# Patient Record
Sex: MALE | Race: WHITE | NOT HISPANIC OR LATINO | Employment: OTHER | URBAN - METROPOLITAN AREA
[De-identification: names, ages, dates, MRNs, and addresses within clinical notes are randomized per-mention and may not be internally consistent; named-entity substitution may affect disease eponyms.]

---

## 2017-09-15 ENCOUNTER — OFFICE VISIT (OUTPATIENT)
Dept: ORTHOPEDIC SURGERY | Facility: CLINIC | Age: 72
End: 2017-09-15

## 2017-09-15 VITALS — WEIGHT: 235 LBS | BODY MASS INDEX: 31.83 KG/M2 | TEMPERATURE: 98.6 F | HEIGHT: 72 IN

## 2017-09-15 DIAGNOSIS — M25.511 ACUTE PAIN OF RIGHT SHOULDER: Primary | ICD-10-CM

## 2017-09-15 DIAGNOSIS — M19.011 PRIMARY OSTEOARTHRITIS OF RIGHT SHOULDER: ICD-10-CM

## 2017-09-15 PROCEDURE — 20610 DRAIN/INJ JOINT/BURSA W/O US: CPT | Performed by: ORTHOPAEDIC SURGERY

## 2017-09-15 PROCEDURE — 73030 X-RAY EXAM OF SHOULDER: CPT | Performed by: ORTHOPAEDIC SURGERY

## 2017-09-15 PROCEDURE — 99214 OFFICE O/P EST MOD 30 MIN: CPT | Performed by: ORTHOPAEDIC SURGERY

## 2017-09-15 RX ADMIN — BUPIVACAINE HYDROCHLORIDE 2 ML: 5 INJECTION, SOLUTION PERINEURAL at 21:24

## 2017-09-15 RX ADMIN — LIDOCAINE HYDROCHLORIDE 2 ML: 10 INJECTION, SOLUTION INFILTRATION; PERINEURAL at 21:24

## 2017-09-15 RX ADMIN — METHYLPREDNISOLONE ACETATE 160 MG: 80 INJECTION, SUSPENSION INTRA-ARTICULAR; INTRALESIONAL; INTRAMUSCULAR; SOFT TISSUE at 21:24

## 2017-09-17 RX ORDER — METHYLPREDNISOLONE ACETATE 80 MG/ML
160 INJECTION, SUSPENSION INTRA-ARTICULAR; INTRALESIONAL; INTRAMUSCULAR; SOFT TISSUE
Status: COMPLETED | OUTPATIENT
Start: 2017-09-15 | End: 2017-09-15

## 2017-09-17 RX ORDER — BUPIVACAINE HYDROCHLORIDE 5 MG/ML
2 INJECTION, SOLUTION PERINEURAL
Status: COMPLETED | OUTPATIENT
Start: 2017-09-15 | End: 2017-09-15

## 2017-09-17 RX ORDER — LIDOCAINE HYDROCHLORIDE 10 MG/ML
2 INJECTION, SOLUTION INFILTRATION; PERINEURAL
Status: COMPLETED | OUTPATIENT
Start: 2017-09-15 | End: 2017-09-15

## 2017-09-18 NOTE — PROGRESS NOTES
Patient: Eliecer Smith    YOB: 1945    Medical Record Number: [id number]    Chief Complaints:  Right shoulder pain    History of Present Illness:     72 y.o. male patient who presents with a long history of progressively worsening pain and dysfunction affecting the right shoulder.  He tells me that his symptoms date back several years but they have gotten significantly worse over the past 3 months or so.  He describes his current pain as mild to moderate, constant, and aching.  He localizes the pain primarily to the posterolateral aspect of the shoulder.  He tells me that he feels that he is losing motion in the shoulder as well.  Anti-inflammatories do help somewhat with his pain but have not helped with his motion.  He is right-hand-dominant.    Allergies:   Allergies   Allergen Reactions   • Mobic [Meloxicam] Rash       Home Medications:    Current Outpatient Prescriptions:   •  naproxen sodium (ALEVE) 220 MG tablet, Take 220 mg by mouth 2 (two) times a day as needed for mild pain (1-3)., Disp: , Rfl:   •  Oxybutynin Chloride (DITROPAN XL PO), Take  by mouth., Disp: , Rfl:   •  tamsulosin (FLOMAX) 0.4 MG capsule 24 hr capsule, Take 1 capsule by mouth daily with breakfast., Disp: , Rfl:   •  amoxicillin (AMOXIL) 875 MG tablet, Take 1 tablet by mouth 2 (two) times a day., Disp: 20 tablet, Rfl: 0  •  cephalexin (KEFLEX) 500 MG capsule, Take 1 capsule by mouth 2 (Two) Times a Day., Disp: 20 capsule, Rfl: 0  •  solifenacin (VESICARE) 5 MG tablet, Take 5 mg by mouth every night., Disp: , Rfl:     Past Medical History:   Diagnosis Date   • Bladder disorder    • Enlarged prostate    • Polio        Past Surgical History:   Procedure Laterality Date   • JOINT REPLACEMENT Left        Social History     Occupational History   • Not on file.     Social History Main Topics   • Smoking status: Former Smoker     Quit date: 3/21/1976   • Smokeless tobacco: Not on file   • Alcohol use 3.6 oz/week     3 Glasses of  "wine, 3 Cans of beer per week   • Drug use: Defer   • Sexual activity: Defer      Social History     Social History Narrative       History reviewed. No pertinent family history.    Review of Systems:      Constitutional: Denies fever, shaking or chills   Eyes: Denies change in visual acuity   HEENT: Denies nasal congestion or sore throat   Respiratory: Denies cough or shortness of breath   Cardiovascular: Denies chest pain or edema  Endocrine: Denies tremors, palpitations, intolerance of heat or cold, polyuria, polydipsia.  GI: Denies abdominal pain, nausea, vomiting, bloody stools or diarrhea  : Denies frequency, urgency, incontinence, retention, or nocturia.  Musculoskeletal: Denies numbness tingling or loss of motor function except as above  Integument: Denies rash, lesion or ulceration   Neurologic: Denies headache or focal weakness, deficits  Heme: Denies epistaxis, spontaneous or excessive bleeding, epistaxis, hematuria, melena, fatigue, enlarged or tender lymph nodes.      All other pertinent positives and negatives as noted above in HPI.    Physical Exam: 72 y.o. male  Vitals:    09/15/17 1002   Temp: 98.6 °F (37 °C)   Weight: 235 lb (107 kg)   Height: 72\" (182.9 cm)       General:  Patient is awake and alert.  Appears in no acute distress or discomfort.    Psych:  Affect and demeanor are appropriate.    Eyes:  Conjunctiva and sclera appear grossly normal.  Eyes track well and EOM seem to be intact.    Ears:  No gross abnormalities.  Hearing adequate for the exam.    Cardiovascular:  Regular rate and rhythm.    Lungs:  Good chest expansion.  Breathing unlabored.    Spine:  Neck appears grossly normal.  No palpable masses or adenopathy.  Good motion.  Spurling's maneuver is negative for any shoulder or arm symptoms.    Extremities:  Skin is benign.  No obvious gross abnormalities about right shoulder.  No palpable masses or adenopathy.  Moderate tenderness noted over anterior glenohumeral joint and rotator " interval.  Motion is to 160° of forward elevation, 40° of external rotation, -5° horizontal abduction, T12 internal rotation.  No instability.  Rotator cuff strength seems to be well preserved but the exam is limited due to patient discomfort with resisted active motion.  Good motor and sensory function in lower arm and hand.  Palpable pulses.         Radiology:  AP, scapular Y, and axillary views of the right shoulder are ordered by myself and reviewed to evaluate the patient's complaint.  No comparison films are immediately available.  The x-rays show moderate glenohumeral osteoarthritis with joint space narrowing, osteophyte formation, and subchondral sclerosis.  The acromiohumeral interval measures normal.    Assessment/Plan:  Right shoulder osteoarthritis    We discussed treatment options in detail including the risks, benefits, and alternatives of conservative treatment versus surgical options.  Regarding conservative treatment, we discussed appropriate activity modifications, anti-inflammatories, injections, and physical therapy.  We also discussed the option of an arthroplasty and all that would entail.  I have recommended that we start with a conservative approach and the patient agrees.    The patient has acknowledged understanding of the information and elected for an injection.  The risks, benefits, and alternatives were discussed.  He consented.  Going forward, he will follow-up as needed.    Large Joint Arthrocentesis  Date/Time: 9/15/2017 9:24 PM  Consent given by: patient  Site marked: site marked  Timeout: Immediately prior to procedure a time out was called to verify the correct patient, procedure, equipment, support staff and site/side marked as required   Supporting Documentation  Indications: pain and joint swelling   Procedure Details  Location: shoulder - R glenohumeral  Preparation: Patient was prepped and draped in the usual sterile fashion  Needle size: 25 G  Approach: anterior  Medications  administered: 2 mL lidocaine 1 %; 160 mg methylPREDNISolone acetate 80 MG/ML; 2 mL bupivacaine  Patient tolerance: patient tolerated the procedure well with no immediate complications            Jere Marion MD    09/15/2017    CC to Sergey Lo MD

## 2018-01-15 ENCOUNTER — OFFICE VISIT (OUTPATIENT)
Dept: ORTHOPEDIC SURGERY | Facility: CLINIC | Age: 73
End: 2018-01-15

## 2018-01-15 VITALS — HEIGHT: 72 IN | WEIGHT: 234 LBS | TEMPERATURE: 98.5 F | BODY MASS INDEX: 31.69 KG/M2

## 2018-01-15 DIAGNOSIS — S80.02XA CONTUSION OF LEFT KNEE, INITIAL ENCOUNTER: ICD-10-CM

## 2018-01-15 DIAGNOSIS — Z96.652 HISTORY OF TOTAL KNEE ARTHROPLASTY, LEFT: Primary | ICD-10-CM

## 2018-01-15 PROCEDURE — 73560 X-RAY EXAM OF KNEE 1 OR 2: CPT | Performed by: ORTHOPAEDIC SURGERY

## 2018-01-15 PROCEDURE — 99213 OFFICE O/P EST LOW 20 MIN: CPT | Performed by: ORTHOPAEDIC SURGERY

## 2018-01-15 RX ORDER — GABAPENTIN 300 MG/1
CAPSULE ORAL
Refills: 0 | COMMUNITY
Start: 2017-12-26 | End: 2018-06-29

## 2018-01-15 RX ORDER — OXYBUTYNIN CHLORIDE 10 MG/1
TABLET, EXTENDED RELEASE ORAL
Refills: 0 | COMMUNITY
Start: 2017-11-07

## 2018-01-15 RX ORDER — ASPIRIN 325 MG
325 TABLET ORAL
COMMUNITY
End: 2018-06-29 | Stop reason: ALTCHOICE

## 2018-01-15 NOTE — PROGRESS NOTES
Patient Name: Eliecer Smith   YOB: 1945  Referring Primary Care Physician: Sergey Lo MD      Chief Complaint:    Chief Complaint   Patient presents with   • Left Knee - Establish Care, Pain        Subjective:    HPI:   Eliecer Smith is a pleasant 72 y.o. year old who presents today for evaluation of   Chief Complaint   Patient presents with   • Left Knee - Establish Care, Pain   . Fell; a few weeks ago n replaced left knee.  Had swelling etc but beter.  Wanted to check. Left tka in 2002    This problem is new to this examiner.     Medications:   Home Medications:  Current Outpatient Prescriptions on File Prior to Visit   Medication Sig   • naproxen sodium (ALEVE) 220 MG tablet Take 220 mg by mouth 2 (two) times a day as needed for mild pain (1-3).   • tamsulosin (FLOMAX) 0.4 MG capsule 24 hr capsule Take 1 capsule by mouth daily with breakfast.   • [DISCONTINUED] amoxicillin (AMOXIL) 875 MG tablet Take 1 tablet by mouth 2 (two) times a day.   • [DISCONTINUED] cephalexin (KEFLEX) 500 MG capsule Take 1 capsule by mouth 2 (Two) Times a Day.   • [DISCONTINUED] Oxybutynin Chloride (DITROPAN XL PO) Take  by mouth.   • [DISCONTINUED] solifenacin (VESICARE) 5 MG tablet Take 5 mg by mouth every night.     No current facility-administered medications on file prior to visit.      Current Medications:  Scheduled Meds:  Continuous Infusions:  No current facility-administered medications for this visit.   PRN Meds:.    I have reviewed the patient's medical history in detail and updated the computerized patient record.  Review and summarization of old records includes:    Past Medical History:   Diagnosis Date   • Bladder disorder    • Enlarged prostate    • Polio         Past Surgical History:   Procedure Laterality Date   • JOINT REPLACEMENT Left         Social History     Occupational History   • Not on file.     Social History Main Topics   • Smoking status: Former Smoker     Quit date: 3/21/1976   •  Smokeless tobacco: Not on file   • Alcohol use 3.6 oz/week     3 Glasses of wine, 3 Cans of beer per week   • Drug use: Defer   • Sexual activity: Defer    Social History     Social History Narrative      History reviewed. No pertinent family history.    ROS: 14 point review of systems was performed and all other systems were reviewed and are negative except for documented findings in HPI and today's encounter.     Allergies:   Allergies   Allergen Reactions   • Mobic [Meloxicam] Rash     Constitutional:  Denies fever, shaking or chills   Eyes:  Denies change in visual acuity   HENT:  Denies nasal congestion or sore throat   Respiratory:  Denies cough or shortness of breath   Cardiovascular:  Denies chest pain or severe LE edema   GI:  Denies abdominal pain, nausea, vomiting, bloody stools or diarrhea   Musculoskeletal:  Numbness, tingling, pain, or loss of motor function only as noted above in history of present illness.  : Denies painful urination or hematuria  Integument:  Denies rash, lesion or ulceration   Neurologic:  Denies headache or focal weakness  Endocrine:  Denies lymphadenopathy  Psych:  Denies confusion or change in mental status   Hem:  Denies active bleeding    Objective:    Physical Exam: 72 y.o. male  Body mass index is 31.74 kg/(m^2)., @WT@, @HT@  Vitals:    01/15/18 0855   Temp: 98.5 °F (36.9 °C)     Vital signs reviewed.   General Appearance:    Alert, cooperative, in no acute distress                  Eyes: conjunctiva clear  ENT: external ears and nose atraumatic  CV: no peripheral edema  Resp: normal respiratory effort  Skin: no rashes or wounds; normal turgor  Psych: mood and affect appropriate  Lymph: no nodes appreciated  Neuro: gross sensation intact  Vascular:  Palpable peripheral pulse in noted extremity  Musculoskeletal Extremities: no bruising or swelling.  good rom and stability.  good patellar tracking    Radiology:   Imaging done today and discussed at length with the  patient:    Indication: pain related symptoms and total joint follow up,  Views: 2V AP&LAT left knee(s)   Findings: S/P left  Total Knee Replacement in good position and alignment  Comparison views: viewed last xray done in the office.       Assessment:     ICD-10-CM ICD-9-CM   1. History of total knee arthroplasty, left Z96.652 V43.65   2. Contusion of left knee, initial encounter S80.02XA 924.11        Procedures       Plan: Biomechanics of pertinent body area discussed.  Risks, benefits, alternatives, comparisons, and complications of accepted medicines, injections, recommendations, surgical procedures, and therapies explained and education provided in laymen's terms. The patient was given the opportunity to ask questions and they were answerved to their satisfaction.   Natural history and expected course of this patient's diagnosis discussed along with evaluation of therapies. Questions answered.  Biomechanics and proper use of ambulatory aids:  crutches, walker, cane, &/or trekking poles.      1/15/2018

## 2018-06-29 ENCOUNTER — OFFICE VISIT (OUTPATIENT)
Dept: ORTHOPEDIC SURGERY | Facility: CLINIC | Age: 73
End: 2018-06-29

## 2018-06-29 VITALS — HEIGHT: 72 IN | WEIGHT: 230 LBS | BODY MASS INDEX: 31.15 KG/M2

## 2018-06-29 DIAGNOSIS — M19.019 ARTHRITIS OF SHOULDER: Primary | ICD-10-CM

## 2018-06-29 PROCEDURE — 20610 DRAIN/INJ JOINT/BURSA W/O US: CPT | Performed by: ORTHOPAEDIC SURGERY

## 2018-06-29 RX ORDER — FLECAINIDE ACETATE 100 MG/1
100 TABLET ORAL
COMMUNITY
Start: 2018-05-19

## 2018-06-29 RX ORDER — DILTIAZEM HYDROCHLORIDE 120 MG/1
120 CAPSULE, EXTENDED RELEASE ORAL
COMMUNITY
Start: 2018-05-19

## 2018-06-29 RX ADMIN — METHYLPREDNISOLONE ACETATE 80 MG: 80 INJECTION, SUSPENSION INTRA-ARTICULAR; INTRALESIONAL; INTRAMUSCULAR; SOFT TISSUE at 15:00

## 2018-06-29 RX ADMIN — LIDOCAINE HYDROCHLORIDE 2 ML: 10 INJECTION, SOLUTION EPIDURAL; INFILTRATION; INTRACAUDAL; PERINEURAL at 15:00

## 2018-07-01 RX ORDER — METHYLPREDNISOLONE ACETATE 80 MG/ML
80 INJECTION, SUSPENSION INTRA-ARTICULAR; INTRALESIONAL; INTRAMUSCULAR; SOFT TISSUE
Status: COMPLETED | OUTPATIENT
Start: 2018-06-29 | End: 2018-06-29

## 2018-07-01 RX ORDER — LIDOCAINE HYDROCHLORIDE 10 MG/ML
2 INJECTION, SOLUTION EPIDURAL; INFILTRATION; INTRACAUDAL; PERINEURAL
Status: COMPLETED | OUTPATIENT
Start: 2018-06-29 | End: 2018-06-29

## 2018-08-31 ENCOUNTER — OFFICE VISIT (OUTPATIENT)
Dept: ORTHOPEDIC SURGERY | Facility: CLINIC | Age: 73
End: 2018-08-31

## 2018-08-31 VITALS — WEIGHT: 230 LBS | BODY MASS INDEX: 31.15 KG/M2 | HEIGHT: 72 IN | TEMPERATURE: 98.7 F

## 2018-08-31 DIAGNOSIS — M19.019 ARTHRITIS OF SHOULDER: Primary | ICD-10-CM

## 2018-08-31 PROCEDURE — 20610 DRAIN/INJ JOINT/BURSA W/O US: CPT | Performed by: ORTHOPAEDIC SURGERY

## 2018-08-31 RX ADMIN — METHYLPREDNISOLONE ACETATE 80 MG: 80 INJECTION, SUSPENSION INTRA-ARTICULAR; INTRALESIONAL; INTRAMUSCULAR; SOFT TISSUE at 16:50

## 2018-08-31 RX ADMIN — LIDOCAINE HYDROCHLORIDE 2 ML: 20 INJECTION, SOLUTION EPIDURAL; INFILTRATION; INTRACAUDAL; PERINEURAL at 16:50

## 2018-09-04 RX ORDER — METHYLPREDNISOLONE ACETATE 80 MG/ML
80 INJECTION, SUSPENSION INTRA-ARTICULAR; INTRALESIONAL; INTRAMUSCULAR; SOFT TISSUE
Status: COMPLETED | OUTPATIENT
Start: 2018-08-31 | End: 2018-08-31

## 2018-09-04 RX ORDER — LIDOCAINE HYDROCHLORIDE 20 MG/ML
2 INJECTION, SOLUTION EPIDURAL; INFILTRATION; INTRACAUDAL; PERINEURAL
Status: COMPLETED | OUTPATIENT
Start: 2018-08-31 | End: 2018-08-31

## 2019-04-03 ENCOUNTER — PREP FOR SURGERY (OUTPATIENT)
Dept: OTHER | Facility: HOSPITAL | Age: 74
End: 2019-04-03

## 2019-04-03 DIAGNOSIS — Z86.010 HISTORY OF COLON POLYPS: Primary | ICD-10-CM

## 2019-04-08 PROBLEM — Z86.010 HISTORY OF COLON POLYPS: Status: ACTIVE | Noted: 2019-04-08

## 2019-04-08 PROBLEM — Z86.0100 HISTORY OF COLON POLYPS: Status: ACTIVE | Noted: 2019-04-08

## 2019-06-28 PROBLEM — M54.50 LUMBAR PAIN WITH RADIATION DOWN LEG: Status: ACTIVE | Noted: 2017-10-20

## 2019-06-28 PROBLEM — M79.606 LUMBAR PAIN WITH RADIATION DOWN LEG: Status: ACTIVE | Noted: 2017-10-20

## 2019-06-28 PROBLEM — N40.0 BPH (BENIGN PROSTATIC HYPERPLASIA): Status: ACTIVE | Noted: 2019-06-28

## 2019-06-28 PROBLEM — G89.29 CHRONIC RIGHT SHOULDER PAIN: Status: ACTIVE | Noted: 2018-12-03

## 2019-06-28 PROBLEM — I48.19 PERSISTENT ATRIAL FIBRILLATION: Status: ACTIVE | Noted: 2018-05-16

## 2019-06-28 PROBLEM — M25.511 CHRONIC RIGHT SHOULDER PAIN: Status: ACTIVE | Noted: 2018-12-03

## 2019-07-03 ENCOUNTER — ANESTHESIA (OUTPATIENT)
Dept: GASTROENTEROLOGY | Facility: HOSPITAL | Age: 74
End: 2019-07-03

## 2019-07-03 ENCOUNTER — ANESTHESIA EVENT (OUTPATIENT)
Dept: GASTROENTEROLOGY | Facility: HOSPITAL | Age: 74
End: 2019-07-03

## 2019-07-03 ENCOUNTER — HOSPITAL ENCOUNTER (OUTPATIENT)
Facility: HOSPITAL | Age: 74
Setting detail: HOSPITAL OUTPATIENT SURGERY
Discharge: HOME | End: 2019-07-03
Attending: COLON & RECTAL SURGERY | Admitting: COLON & RECTAL SURGERY

## 2019-07-03 VITALS
OXYGEN SATURATION: 95 % | BODY MASS INDEX: 29.8 KG/M2 | HEART RATE: 66 BPM | HEIGHT: 72 IN | DIASTOLIC BLOOD PRESSURE: 80 MMHG | RESPIRATION RATE: 16 BRPM | TEMPERATURE: 97.7 F | WEIGHT: 220 LBS | SYSTOLIC BLOOD PRESSURE: 113 MMHG

## 2019-07-03 DIAGNOSIS — Z86.010 HISTORY OF COLON POLYPS: ICD-10-CM

## 2019-07-03 PROCEDURE — 45380 COLONOSCOPY AND BIOPSY: CPT | Performed by: COLON & RECTAL SURGERY

## 2019-07-03 PROCEDURE — 88305 TISSUE EXAM BY PATHOLOGIST: CPT | Performed by: COLON & RECTAL SURGERY

## 2019-07-03 PROCEDURE — 25010000002 PROPOFOL 10 MG/ML EMULSION: Performed by: ANESTHESIOLOGY

## 2019-07-03 RX ORDER — SODIUM CHLORIDE, SODIUM LACTATE, POTASSIUM CHLORIDE, CALCIUM CHLORIDE 600; 310; 30; 20 MG/100ML; MG/100ML; MG/100ML; MG/100ML
30 INJECTION, SOLUTION INTRAVENOUS CONTINUOUS PRN
Status: DISCONTINUED | OUTPATIENT
Start: 2019-07-03 | End: 2019-07-03 | Stop reason: HOSPADM

## 2019-07-03 RX ORDER — PROPOFOL 10 MG/ML
VIAL (ML) INTRAVENOUS CONTINUOUS PRN
Status: DISCONTINUED | OUTPATIENT
Start: 2019-07-03 | End: 2019-07-03 | Stop reason: SURG

## 2019-07-03 RX ORDER — PROPOFOL 10 MG/ML
VIAL (ML) INTRAVENOUS AS NEEDED
Status: DISCONTINUED | OUTPATIENT
Start: 2019-07-03 | End: 2019-07-03 | Stop reason: SURG

## 2019-07-03 RX ORDER — LIDOCAINE HYDROCHLORIDE 20 MG/ML
INJECTION, SOLUTION INFILTRATION; PERINEURAL AS NEEDED
Status: DISCONTINUED | OUTPATIENT
Start: 2019-07-03 | End: 2019-07-03 | Stop reason: SURG

## 2019-07-03 RX ORDER — SODIUM CHLORIDE 0.9 % (FLUSH) 0.9 %
3 SYRINGE (ML) INJECTION EVERY 12 HOURS SCHEDULED
Status: DISCONTINUED | OUTPATIENT
Start: 2019-07-03 | End: 2019-07-03 | Stop reason: HOSPADM

## 2019-07-03 RX ORDER — SODIUM CHLORIDE 0.9 % (FLUSH) 0.9 %
3-10 SYRINGE (ML) INJECTION AS NEEDED
Status: DISCONTINUED | OUTPATIENT
Start: 2019-07-03 | End: 2019-07-03 | Stop reason: HOSPADM

## 2019-07-03 RX ADMIN — LIDOCAINE HYDROCHLORIDE 50 MG: 20 INJECTION, SOLUTION INFILTRATION; PERINEURAL at 07:35

## 2019-07-03 RX ADMIN — PROPOFOL 125 MG: 10 INJECTION, EMULSION INTRAVENOUS at 07:35

## 2019-07-03 RX ADMIN — PROPOFOL 140 MCG/KG/MIN: 10 INJECTION, EMULSION INTRAVENOUS at 07:35

## 2019-07-03 RX ADMIN — SODIUM CHLORIDE, POTASSIUM CHLORIDE, SODIUM LACTATE AND CALCIUM CHLORIDE 30 ML/HR: 600; 310; 30; 20 INJECTION, SOLUTION INTRAVENOUS at 07:09

## 2019-07-03 NOTE — DISCHARGE INSTRUCTIONS
For the next 24 hours patient needs to be with a responsible adult.    For 24 hours DO NOT drive, operate machinery, appliances, drink alcohol, make important decisions or sign legal documents.    Start with a light or bland diet if you are feeling sick to your stomach otherwise advance to regular diet as tolerated.    Follow recommendations on procedure report if provided by your doctor.    Call Dr LAZO for problems [phone]    Problems may include but not limited to: large amounts of bleeding, trouble breathing, repeated vomiting, severe unrelieved pain, fever or chills.

## 2019-07-03 NOTE — H&P
Eliecer Smith is a 74 y.o. male  who is referred by Citlalli Gonzalez MD for a colonoscopy. He is an  has a history of previous adenomatous polyp.     He denies any change in bowel function, melena, or hematochezia.    Past Medical History:   Diagnosis Date   • Arthritis    • Bladder disorder    • BPH (benign prostatic hyperplasia)    • Cardiomegaly 08/2016   • Chronic right-sided low back pain without sciatica    • Colon polyps     FOLLOWED BY DR. CITLALLI GONZALEZ   • DDD (degenerative disc disease), lumbar    • ED (erectile dysfunction)    • Hemoptysis 10/2013   • Hemorrhagic otitis externa of right ear 10/06/2016   • Hyperlipidemia    • IFG (impaired fasting glucose)    • Irregular heart beat    • Laceration of finger 06/04/2018    LEFT MIDDLE FINGER   • Leg edema, left 07/31/2016    WITH CONTUSION, SEEN AT [facility] ER   • OA (osteoarthritis)    • PAF (paroxysmal atrial fibrillation) (CMS/HCC) 05/15/2018    ADMITTED TO [facility]   • Pharyngitis 05/02/2016    SEEN AT [facility]   • Pharyngitis 03/21/2016    SEEN AT [facility]   • Pneumonia 10/2010   • Polio 1946   • Rectal bleeding    • Skin cancer     SQUAMOUS CELL CARCINOMA OF SCALP   • Swimmer's ear of left side 10/04/2016    SEEN AT [facility]   • Thrombocytopenia (CMS/HCC)        Past Surgical History:   Procedure Laterality Date   • CARDIOVERSION N/A 05/17/2018    DR. LUCA AGUIRRE AT [facility]   • COLONOSCOPY N/A 01/20/2009    ENTIRE COLON WNL, RESCOPE IN 5-10 YRS, DR. EZEKIEL OLIVEIRA AT [facility]   • COLONOSCOPY N/A 02/03/2004    ENTIRE COLON WNL, DR. EZEKIEL OLIVEIRA AT [facility]   • COLONOSCOPY W/ POLYPECTOMY N/A 02/12/2014    7 MM ADENOMATOUS POLYP IN TRANSVERSE, 5 MM ADENOMATOUS POLYP IN PROXIMAL TRANSVERSE, RESCOPE IN 5 YRS, DR. CITLALLI GONZALEZ AT [facility]   • KNEE ARTHROSCOPY  1993   • KNEE ARTHROSCOPY Left 08/14/2001    DR. VANESSA FRITZ AT [facility]   • TOTAL KNEE ARTHROPLASTY Left 06/11/2002    DR. VANESSA FRITZ AT [facility]   • TOTAL SHOULDER REVERSE ARTHROPLASTY Right 12/13/2018    DR. PATRICIA PENA AT  [facility]       Medications Prior to Admission   Medication Sig Dispense Refill Last Dose   • diltiaZEM (TIAZAC) 120 MG 24 hr capsule Take 120 mg by mouth.   7/3/2019 at Unknown time   • flecainide (TAMBOCOR) 100 MG tablet Take 100 mg by mouth.   7/3/2019 at Unknown time   • oxybutynin XL (DITROPAN-XL) 10 MG 24 hr tablet take 1 tablet by mouth once daily  0 7/3/2019 at Unknown time   • tamsulosin (FLOMAX) 0.4 MG capsule 24 hr capsule Take 1 capsule by mouth daily with breakfast.   7/3/2019 at Unknown time   • rivaroxaban (XARELTO) 20 MG tablet Take 20 mg by mouth.   2019       Allergies   Allergen Reactions   • Mobic [Meloxicam] Rash       Family History   Problem Relation Age of Onset   • Heart attack Brother    • Heart disease Brother        Social History     Socioeconomic History   • Marital status:      Spouse name: Not on file   • Number of children: Not on file   • Years of education: Not on file   • Highest education level: Not on file   Tobacco Use   • Smoking status: Former Smoker     Last attempt to quit: 3/21/1976     Years since quittin.3   • Smokeless tobacco: Never Used   Substance and Sexual Activity   • Alcohol use: Yes     Alcohol/week: 3.6 oz     Types: 3 Glasses of wine, 3 Cans of beer per week     Comment: MODERATE AMT   • Drug use: No   • Sexual activity: Defer       Review of Systems   Gastrointestinal: Negative for abdominal pain, nausea and vomiting.   All other systems reviewed and are negative.      Vitals:    19 0657   BP: 119/76   Pulse: 66   Resp: 16   Temp: 97.7 °F (36.5 °C)   SpO2: 96%         Physical Exam   Constitutional: He is oriented to person, place, and time. He appears well-developed and well-nourished.   HENT:   Head: Normocephalic and atraumatic.   Eyes: EOM are normal. Pupils are equal, round, and reactive to light.   Cardiovascular: Regular rhythm.   Pulmonary/Chest: Effort normal.   Abdominal: Soft. He exhibits no distension.   Musculoskeletal: Normal  range of motion.   Neurological: He is alert and oriented to person, place, and time.   Skin: Skin is warm and dry.   Psychiatric: Judgment and thought content normal.         Assessment/Plan      previous adenomatous polyp.         I recommend colonoscopy.  I described risks, benefits of the procedure with the patient including but not limited to bleeding, infection, possibility of perforation and possible polypectomy. All of the patient's questions were answered and they would like to proceed with the above recommendations.

## 2019-07-03 NOTE — ANESTHESIA POSTPROCEDURE EVALUATION
Patient: Eliecer Smith    Procedure Summary     Date:  07/03/19 Room / Location:  [facility] ENDOSCOPY 9 /  [facility]    Anesthesia Start:  0733 Anesthesia Stop:  0755    Procedure:  COLONOSCOPY to cecum with biopsy / polypectomies (N/A ) Diagnosis:       History of colon polyps      (History of colon polyps [Z86.010])    Surgeon:  Tone Gonzalez MD Provider:  Gustabo Whitehead MD    Anesthesia Type:  MAC ASA Status:  3          Anesthesia Type: MAC  Last vitals  BP   105/56 (07/03/19 0807)   Temp   36.5 °C (97.7 °F) (07/03/19 0657)   Pulse   74 (07/03/19 0807)   Resp   16 (07/03/19 0807)     SpO2   97 % (07/03/19 0807)     Post Anesthesia Care and Evaluation    Patient location during evaluation: bedside  Patient participation: complete - patient participated  Level of consciousness: awake and alert  Pain score: 0  Pain management: adequate  Airway patency: patent  Anesthetic complications: No anesthetic complications  PONV Status: none  Cardiovascular status: acceptable  Respiratory status: acceptable  Hydration status: acceptable  Post Neuraxial Block status: Motor and sensory function returned to baseline

## 2019-07-05 LAB
CYTO UR: NORMAL
LAB AP CASE REPORT: NORMAL
LAB AP CLINICAL INFORMATION: NORMAL
PATH REPORT.FINAL DX SPEC: NORMAL
PATH REPORT.GROSS SPEC: NORMAL

## 2021-02-11 ENCOUNTER — OFFICE VISIT (OUTPATIENT)
Dept: ORTHOPEDIC SURGERY | Facility: CLINIC | Age: 76
End: 2021-02-11

## 2021-02-11 VITALS — BODY MASS INDEX: 31.05 KG/M2 | WEIGHT: 229.28 LBS | HEIGHT: 72 IN | TEMPERATURE: 98 F

## 2021-02-11 DIAGNOSIS — Z96.652 STATUS POST TOTAL LEFT KNEE REPLACEMENT: ICD-10-CM

## 2021-02-11 DIAGNOSIS — M71.50 TRAUMATIC BURSITIS: ICD-10-CM

## 2021-02-11 DIAGNOSIS — R52 PAIN: Primary | ICD-10-CM

## 2021-02-11 PROCEDURE — 99213 OFFICE O/P EST LOW 20 MIN: CPT | Performed by: ORTHOPAEDIC SURGERY

## 2021-02-11 PROCEDURE — 73562 X-RAY EXAM OF KNEE 3: CPT | Performed by: ORTHOPAEDIC SURGERY

## 2021-02-11 RX ORDER — AMOXICILLIN 500 MG/1
2000 CAPSULE ORAL
COMMUNITY
Start: 2020-11-02

## 2021-02-11 RX ORDER — CETIRIZINE HYDROCHLORIDE 10 MG/1
10 TABLET ORAL DAILY
COMMUNITY
Start: 2020-04-16 | End: 2021-04-16

## 2021-02-11 NOTE — PROGRESS NOTES
Patient Name: Eliecer Smith   YOB: 1945  Referring Primary Care Physician: Sergey Lo MD  BMI: Body mass index is 31.09 kg/m².    Chief Complaint:    Chief Complaint   Patient presents with   • Left Knee - Follow-up, Pain        HPI:     Eliecer Smith is a 75 y.o. male who presents today for evaluation of   Chief Complaint   Patient presents with   • Left Knee - Follow-up, Pain     The patient reports he slipped and fell on a wet bathroom floor at 5:30 this morning. He confirms he landed on his left knee and noted immediate swelling to the knee. He states he was able to ambulate on the knee, but it is still stiff. The patient reports the left knee has become stiffer over time but denies increased swelling. He confirms taking a blood thinner.    Subjective   Medications:   Home Medications:  Current Outpatient Medications on File Prior to Visit   Medication Sig   • cephalexin (KEFLEX) 500 MG capsule Take 1 capsule by mouth 2 (Two) Times a Day.   • cetirizine (zyrTEC) 10 MG tablet Take 10 mg by mouth Daily.   • diltiaZEM (TIAZAC) 120 MG 24 hr capsule Take 120 mg by mouth.   • flecainide (TAMBOCOR) 100 MG tablet Take 100 mg by mouth.   • mupirocin (BACTROBAN) 2 % ointment Apply  topically to the appropriate area as directed 3 (Three) Times a Day.   • oxybutynin XL (DITROPAN-XL) 10 MG 24 hr tablet take 1 tablet by mouth once daily   • rivaroxaban (XARELTO) 20 MG tablet Take 20 mg by mouth.   • tamsulosin (FLOMAX) 0.4 MG capsule 24 hr capsule Take 1 capsule by mouth daily with breakfast.   • amoxicillin (AMOXIL) 500 MG capsule Take 2,000 mg by mouth.     No current facility-administered medications on file prior to visit.      Current Medications:  Scheduled Meds:  Continuous Infusions:No current facility-administered medications for this visit.     PRN Meds:.    I have reviewed the patient's medical history in detail and updated the computerized patient record.  Review and summarization of old  records includes:    Past Medical History:   Diagnosis Date   • Arthritis    • Bladder disorder    • BPH (benign prostatic hyperplasia)    • Cardiomegaly 08/2016   • Chronic right-sided low back pain without sciatica    • Colon polyps     FOLLOWED BY DR. CITLALLI LAZO   • DDD (degenerative disc disease), lumbar    • ED (erectile dysfunction)    • Hemoptysis 10/2013   • Hemorrhagic otitis externa of right ear 10/06/2016   • Hyperlipidemia    • IFG (impaired fasting glucose)    • Irregular heart beat    • Laceration of finger 06/04/2018    LEFT MIDDLE FINGER   • Leg edema, left 07/31/2016    WITH CONTUSION, SEEN AT [facility] ER   • OA (osteoarthritis)    • PAF (paroxysmal atrial fibrillation) (CMS/HCC) 05/15/2018    ADMITTED TO [facility]   • Pharyngitis 05/02/2016    SEEN AT [facility]   • Pharyngitis 03/21/2016    SEEN AT [facility]   • Pneumonia 10/2010   • Polio 1945   • Rectal bleeding    • Skin cancer     SQUAMOUS CELL CARCINOMA OF SCALP   • Swimmer's ear of left side 10/04/2016    SEEN AT [facility]   • Thrombocytopenia (CMS/HCC)         Past Surgical History:   Procedure Laterality Date   • CARDIOVERSION N/A 05/17/2018    DR. LUCA AGUIRRE AT [facility]   • COLONOSCOPY N/A 01/20/2009    ENTIRE COLON WNL, RESCOPE IN 5-10 YRS, DR. EZEKIEL OLIVEIRA AT [facility]   • COLONOSCOPY N/A 02/03/2004    ENTIRE COLON WNL, DR. EZEKIEL OLIVEIRA AT [facility]   • COLONOSCOPY N/A 7/3/2019    5 MM TUBULAR ADENOMA POLYP IN ILEOCECAL VALVE, 4 MM HYPERPLASTIC POLYP IN SIGMOID, RESCOPE IN 5 YRS, DR. CITLALLI LAZO AT [facility]   • COLONOSCOPY W/ POLYPECTOMY N/A 02/12/2014    7 MM ADENOMATOUS POLYP IN TRANSVERSE, 5 MM ADENOMATOUS POLYP IN PROXIMAL TRANSVERSE, RESCOPE IN 5 YRS, DR. CITLALLI LAZO AT [facility]   • EYE SURGERY     • KNEE ARTHROSCOPY  1993   • KNEE ARTHROSCOPY Left 08/14/2001    DR. VANESSA FRITZ AT [facility]   • TOTAL KNEE ARTHROPLASTY Left 06/11/2002    DR. VANESSA FRITZ AT [facility]   • TOTAL SHOULDER REVERSE ARTHROPLASTY Right 12/13/2018    DR. PATRICIA PENA AT [facility]        Social  "History     Occupational History   • Not on file   Tobacco Use   • Smoking status: Former Smoker     Quit date: 3/21/1976     Years since quittin.9   • Smokeless tobacco: Never Used   Substance and Sexual Activity   • Alcohol use: Yes     Alcohol/week: 6.0 standard drinks     Types: 3 Glasses of wine, 3 Cans of beer per week     Comment: MODERATE AMT   • Drug use: No   • Sexual activity: Defer      Social History     Social History Narrative   • Not on file        Family History   Problem Relation Age of Onset   • Heart attack Brother    • Heart disease Brother        ROS: 14 point review of systems was performed and all other systems were reviewed and are negative except for documented findings in HPI and today's encounter.     Allergies:   Allergies   Allergen Reactions   • Mobic [Meloxicam] Rash     Constitutional:  Denies fever, shaking or chills   Eyes:  Denies change in visual acuity   HENT:  Denies nasal congestion or sore throat   Respiratory:  Denies cough or shortness of breath   Cardiovascular:  Denies chest pain or severe LE edema   GI:  Denies abdominal pain, nausea, vomiting, bloody stools or diarrhea   Musculoskeletal:  Numbness, tingling, pain, or loss of motor function only as noted above in history of present illness.  : Denies painful urination or hematuria  Integument:  Denies rash, lesion or ulceration   Neurologic:  Denies headache or focal weakness  Endocrine:  Denies lymphadenopathy  Psych:  Denies confusion or change in mental status   Hem:  Denies active bleeding    OBJECTIVE:  Physical Exam: 75 y.o. male  Wt Readings from Last 3 Encounters:   21 104 kg (229 lb 4.5 oz)   20 104 kg (230 lb)   20 104 kg (230 lb)     Ht Readings from Last 1 Encounters:   21 182.9 cm (72.01\")     Body mass index is 31.09 kg/m².  Vitals:    21 1037   Temp: 98 °F (36.7 °C)     Vital signs reviewed.     General Appearance:    Alert, cooperative, in no acute distress             "      Eyes: conjunctiva clear  ENT: external ears and nose atraumatic  CV: no peripheral edema  Resp: normal respiratory effort  Skin: no rashes or wounds; normal turgor. Old, midline scar on the left knee from previous surgery. Erythema over the anterior region of the left knee over the bursa and the patella.   Psych: mood and affect appropriate  Lymph: no nodes appreciated  Neuro: gross sensation intact  Vascular:  Palpable peripheral pulse in noted extremity  Musculoskeletal Extremities: Left knee has full range of motion. Varus and valgus stress cause slight laxity with varus stress, but no instability consistent with polyethylene wear. Comparing the let knee to the right knee, the left knee has a small ballotable effusion in the prepatellar bursa consistent with a traumatic bursitis. Anterior and posterior stability is good. Hips are noncontributory. Ambulates with a mildly stiff-legged gait at first but overall ambulates well.    Radiology:   Two views of the left knee, including AP and lateral, were obtained today for history of pain after a fall. Compared to previous x-ray, this shows a well-placed, well-aligned, well-fixed total knee arthroplasty on the left. There is a small, rounded osteophyte superior to it, but that is evident on previous x-rays.    Assessment:     ICD-10-CM ICD-9-CM   1. Pain  R52 780.96   2. Status post total left knee replacement  Z96.652 V43.65   3. Traumatic bursitis  M71.50 727.3        Procedures       Plan:   The diagnosis(es), natural history, pathophysiology and treatment for diagnosis(es) were discussed. Opportunity given and questions answered.  Biomechanics of pertinent body areas discussed.  When appropriate, the use of ambulatory aids discussed.    The diagnosis(es), natural history, pathophysiology and treatment for diagnosis(es) were discussed. Opportunity given and questions answered.  Biomechanics of pertinent body areas discussed.  When appropriate, the use of  ambulatory aids discussed.  Inflammation/pain control; with cold, heat, elevation and/or liniments discussed as appropriate    We discussed treatment options for the left knee. I recommend ice application, compression with an ACE bandage, and Tylenol. He cannot take anti-inflammatory medication. I recommend that he continue taking the blood thinner. If the left knee swelling increases, or his pain worsens, we can consider aspirating the bursa. I discussed with the patient the anatomy of the bursa and how swelling can affect it. I counseled him regarding the risks of recurrence given that he is on a blood thinner, as well as the risk of infection. He was advised that aspiration may be indicated if he experiences fever, chills, or increased erythema, swelling, or warmth to the knee; however, an injury of this nature usually resolves with compression and ice.      Scribed for Yosef Joseph MD by JENNIFER MURRAY.  2/11/2021  11:43 EST    I Yosef Joseph MD have personally performed the services described in this document as scribed by the above individual, and it is both accurate and complete.     Yosef Joseph MD  2/11/2021  12:08 EST      2/11/2021

## 2021-06-10 ENCOUNTER — TELEPHONE (OUTPATIENT)
Dept: ORTHOPEDIC SURGERY | Facility: CLINIC | Age: 76
End: 2021-06-10

## 2021-06-10 ENCOUNTER — OFFICE VISIT (OUTPATIENT)
Dept: ORTHOPEDIC SURGERY | Facility: CLINIC | Age: 76
End: 2021-06-10

## 2021-06-10 VITALS — HEIGHT: 72 IN | TEMPERATURE: 97.3 F | BODY MASS INDEX: 31.15 KG/M2 | WEIGHT: 230 LBS

## 2021-06-10 DIAGNOSIS — Z86.12 HISTORY OF POLIOMYELITIS: ICD-10-CM
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
## 2021-06-10 DIAGNOSIS — R52 PAIN: Primary | ICD-10-CM

## 2021-06-10 DIAGNOSIS — M25.571 PAIN AND SWELLING OF RIGHT ANKLE: ICD-10-CM

## 2021-06-10 DIAGNOSIS — M25.471 PAIN AND SWELLING OF RIGHT ANKLE: ICD-10-CM

## 2021-06-10 DIAGNOSIS — Z96.652 HISTORY OF TOTAL KNEE ARTHROPLASTY, LEFT: ICD-10-CM

## 2021-06-10 PROCEDURE — 73610 X-RAY EXAM OF ANKLE: CPT | Performed by: NURSE PRACTITIONER

## 2021-06-10 PROCEDURE — 99213 OFFICE O/P EST LOW 20 MIN: CPT | Performed by: NURSE PRACTITIONER

## 2021-06-10 RX ORDER — FINASTERIDE 5 MG/1
5 TABLET, FILM COATED ORAL DAILY
COMMUNITY
Start: 2021-06-03

## 2021-06-10 RX ORDER — FLUTICASONE PROPIONATE 50 MCG
1 SPRAY, SUSPENSION (ML) NASAL AS NEEDED
COMMUNITY
Start: 2021-05-24

## 2021-06-10 NOTE — TELEPHONE ENCOUNTER
Caller: MALINDA SHARP    Relationship to patient: SELF    Best call back number: [phone]    Chief complaint:  SPM PATIENT. PATIENT FELL ON 5/24/21 AND HAS PAIN IN HIS RIGHT LEG FROM BELOW THE KNEE TO THE ANKLE AND HAS SWELLING IN THE RIGHT ANKLE. DOES [facility] WANT TO SEE PATIENT FOR THIS OR REFER?    Type of visit: NEW PROBLEM    Requested date: ASAP

## 2021-06-10 NOTE — TELEPHONE ENCOUNTER
Caller: MALINDA SHARP     Relationship: SELF     What was the call regarding: PATIENT RETURNED PHONE CALL- CONFIRMED APPT IS FINE FOR THIS AFTERNOON W/ XI FRITZ - UPDATED APPOINTMENT  NOTE  AS WELL

## 2021-06-10 NOTE — PROGRESS NOTES
Patient Name: Eliecer Smith   YOB: 1945  Referring Primary Care Physician: Sergey Lo MD  BMI: Body mass index is 31.19 kg/m².    Chief Complaint:    Chief Complaint   Patient presents with   • Right Ankle - Pain        HPI: New patient to me patient of Dr. Soliz has had a total knee arthroplasty has a history of poliomyelitis to the right lower extremity fell playing golf on May 24 has been limping around and decided to be seen today because of swelling and pain in his ankle. Pt has been having pain and swelling and noted some redness to ankle this am. He wears an elevated build up on his shoe. He is on xarelto for a fib    Eliecer Smith is a 76 y.o. male who presents today for evaluation of   Chief Complaint   Patient presents with   • Right Ankle - Pain         Subjective   Medications:   Home Medications:  Current Outpatient Medications on File Prior to Visit   Medication Sig   • diltiaZEM (TIAZAC) 120 MG 24 hr capsule Take 120 mg by mouth.   • finasteride (PROSCAR) 5 MG tablet Take 5 mg by mouth Daily.   • flecainide (TAMBOCOR) 100 MG tablet Take 100 mg by mouth.   • oxybutynin XL (DITROPAN-XL) 10 MG 24 hr tablet take 1 tablet by mouth once daily   • rivaroxaban (XARELTO) 20 MG tablet Take 20 mg by mouth.   • tamsulosin (FLOMAX) 0.4 MG capsule 24 hr capsule Take 1 capsule by mouth daily with breakfast.   • amoxicillin (AMOXIL) 500 MG capsule Take 2,000 mg by mouth.   • cephalexin (KEFLEX) 500 MG capsule Take 1 capsule by mouth 2 (Two) Times a Day.   • cetirizine (zyrTEC) 10 MG tablet Take 10 mg by mouth Daily.   • fluticasone (FLONASE) 50 MCG/ACT nasal spray 1 spray by Each Nare route As Needed.   • mupirocin (BACTROBAN) 2 % ointment Apply  topically to the appropriate area as directed 3 (Three) Times a Day.     No current facility-administered medications on file prior to visit.     Current Medications:  Scheduled Meds:  Continuous Infusions:No current facility-administered  medications for this visit.    PRN Meds:.    I have reviewed the patient's medical history in detail and updated the computerized patient record.  Review and summarization of old records includes:    Past Medical History:   Diagnosis Date   • Arthritis    • Bladder disorder    • BPH (benign prostatic hyperplasia)    • Cardiomegaly 08/2016   • Chronic right-sided low back pain without sciatica    • Colon polyps     FOLLOWED BY DR. CITLALLI LAZO   • DDD (degenerative disc disease), lumbar    • ED (erectile dysfunction)    • Hemoptysis 10/2013   • Hemorrhagic otitis externa of right ear 10/06/2016   • Hyperlipidemia    • IFG (impaired fasting glucose)    • Irregular heart beat    • Laceration of finger 06/04/2018    LEFT MIDDLE FINGER   • Leg edema, left 07/31/2016    WITH CONTUSION, SEEN AT [facility] ER   • OA (osteoarthritis)    • PAF (paroxysmal atrial fibrillation) (CMS/HCC) 05/15/2018    ADMITTED TO [place]   • Pharyngitis 05/02/2016    SEEN AT [facility]   • Pharyngitis 03/21/2016    SEEN AT [facility]   • Pneumonia 10/2010   • Polio 1946   • Rectal bleeding    • Skin cancer     SQUAMOUS CELL CARCINOMA OF SCALP   • Swimmer's ear of left side 10/04/2016    SEEN AT [facility]   • Thrombocytopenia (CMS/HCC)         Past Surgical History:   Procedure Laterality Date   • CARDIOVERSION N/A 05/17/2018    DR. LUCA AGUIRRE AT [place]   • COLONOSCOPY N/A 01/20/2009    ENTIRE COLON WNL, RESCOPE IN 5-10 YRS, DR. EZEKIEL OLIVEIRA AT [facility]   • COLONOSCOPY N/A 02/03/2004    ENTIRE COLON WNL, DR. EZEKIEL OLIVEIRA AT [facility]   • COLONOSCOPY N/A 7/3/2019    5 MM TUBULAR ADENOMA POLYP IN ILEOCECAL VALVE, 4 MM HYPERPLASTIC POLYP IN SIGMOID, RESCOPE IN 5 YRS, DR. CITLALLI LAZO AT [facility]   • COLONOSCOPY W/ POLYPECTOMY N/A 02/12/2014    7 MM ADENOMATOUS POLYP IN TRANSVERSE, 5 MM ADENOMATOUS POLYP IN PROXIMAL TRANSVERSE, RESCOPE IN 5 YRS, DR. CITLALLI LAZO AT [facility]   • EYE SURGERY     • KNEE ARTHROSCOPY  1993   • KNEE ARTHROSCOPY Left 08/14/2001    DR. VANESSA FRITZ AT [place]  "  • TOTAL KNEE ARTHROPLASTY Left 2002    DR. VANESSA FRITZ AT [place]   • TOTAL SHOULDER REVERSE ARTHROPLASTY Right 2018    DR. PATRICIA PENA AT [facility]        Social History     Occupational History   • Not on file   Tobacco Use   • Smoking status: Former Smoker     Quit date: 3/21/1976     Years since quittin.2   • Smokeless tobacco: Never Used   Substance and Sexual Activity   • Alcohol use: Yes     Alcohol/week: 6.0 standard drinks     Types: 3 Glasses of wine, 3 Cans of beer per week     Comment: MODERATE AMT   • Drug use: No   • Sexual activity: Defer      Social History     Social History Narrative   • Not on file        Family History   Problem Relation Age of Onset   • Heart attack Brother    • Heart disease Brother        ROS: 14 point review of systems was performed and all other systems were reviewed and are negative except for documented findings in HPI and today's encounter.     Allergies:   Allergies   Allergen Reactions   • Mobic [Meloxicam] Rash     Constitutional:  Denies fever, shaking or chills   Eyes:  Denies change in visual acuity   HENT:  Denies nasal congestion or sore throat   Respiratory:  Denies cough or shortness of breath   Cardiovascular:  Denies chest pain or severe LE edema   GI:  Denies abdominal pain, nausea, vomiting, bloody stools or diarrhea   Musculoskeletal:  Numbness, tingling, pain, or loss of motor function only as noted above in history of present illness.  : Denies painful urination or hematuria  Integument:  Denies rash, lesion or ulceration   Neurologic:  Denies headache or focal weakness  Endocrine:  Denies lymphadenopathy  Psych:  Denies confusion or change in mental status   Hem:  Denies active bleeding    OBJECTIVE:  Physical Exam: 76 y.o. male  Wt Readings from Last 3 Encounters:   06/10/21 104 kg (230 lb)   21 104 kg (229 lb 4.5 oz)   20 104 kg (230 lb)     Ht Readings from Last 1 Encounters:   06/10/21 182.9 cm (72\")     Body mass index " is 31.19 kg/m².  Vitals:    06/10/21 1448   Temp: 97.3 °F (36.3 °C)     Vital signs reviewed.     General Appearance:    Alert, cooperative, in no acute distress                  Eyes: conjunctiva clear  ENT: external ears and nose atraumatic  CV: no peripheral edema  Resp: normal respiratory effort  Skin: no rashes or wounds; normal turgor  Psych: mood and affect appropriate  Lymph: no nodes appreciated  Neuro: gross sensation intact  Vascular:  Palpable peripheral pulse in noted extremity  Musculoskeletal Extremities: Skin is warm dry and intact with good pulses movement and sensation he is quite a bit of swelling medially into the lateral malleolus base of fifth metatarsal is nontender he has a bit of a slope deformity due to polio difficult to ascertain normal structure there appears to be no views deformity compartments are soft there is some redness to the lateral malleolus is not a fracture of the wrist cervix could be an early area of prominent swelling    Radiology: Right ankle 3 views done for pain no comparison views readily available no acute fracture arthritic changes noted        Assessment:     ICD-10-CM ICD-9-CM   1. Pain  R52 780.96   2. History of total knee arthroplasty, left  Z96.652 V43.65   3. Accidental fall, initial encounter  W19.XXXA E888.9   4. History of poliomyelitis  Z86.12 V12.02   5. Pain and swelling of right ankle  M25.571 719.47    M25.471 719.07        MDM/Plan:   The diagnosis(es), natural history, pathophysiology and treatment for diagnosis(es) were discussed. Opportunity given and questions answered.  Biomechanics of pertinent body areas discussed.  When appropriate, the use of ambulatory aids discussed.    The diagnosis(es), natural history, pathophysiology and treatment for diagnosis(es) were discussed. Opportunity given and questions answered.  Biomechanics of pertinent body areas discussed.  When appropriate, the use of ambulatory aids discussed.  EXERCISES:  Advice on  benefits of, and types of regular/moderate exercise pertaining to orthopedic diagnosis(es).  MEDICATIONS:  The risks, benefits, warnings,side effects and alternatives of medications discussed.  Inflammation/pain control; with cold, heat, elevation and/or liniments discussed as appropriate  MRI.  MEDICAL RECORDS reviewed from other provider(s) for past and current medical history pertinent to this complaint.      6/10/2021    Much of this encounter note is an electronic transcription/translation of spoken language to printed text. The electronic translation of spoken language may permit erroneous, or at times, nonsensical words or phrases to be inadvertently transcribed; Although I have reviewed the note for such errors, some may still exist

## 2021-06-10 NOTE — TELEPHONE ENCOUNTER
CALLED AND LEFT VOICE MAIL MESSAGE OFFERING PATIENT APPT THIS AFTERNOON @ 2:30 WITH SEEMA MEYERS IN THE [facility].ADVISED TO CALL BACK.

## 2021-06-21 ENCOUNTER — TELEPHONE (OUTPATIENT)
Dept: ORTHOPEDIC SURGERY | Facility: CLINIC | Age: 76
End: 2021-06-21

## 2021-06-21 NOTE — TELEPHONE ENCOUNTER
----- Message from Eliecer Smith sent at 6/21/2021  1:59 PM EDT -----  Regarding: Test Results Question  Contact: [phone]  Ms. Landin, I was wondering if you had a chance to review the MRI results for the MRI I had on Saturday at [place].  I still have some pain but now it seems to be in the area above my ankle----like in the shin area.  If you could let me know the results it would be appreciated.  Thanks  Jason Smith

## 2021-06-22 ENCOUNTER — TELEPHONE (OUTPATIENT)
Dept: ORTHOPEDIC SURGERY | Facility: CLINIC | Age: 76
End: 2021-06-22

## 2021-06-22 NOTE — TELEPHONE ENCOUNTER
Patient has seen [facility] for the right leg/ankle and was referred to [facility]. Patient called wanting to schedule an appointment with [facility] for the right leg and has an appt. Scheduled with [facility] for the issue. Please Advise

## 2021-06-22 NOTE — TELEPHONE ENCOUNTER
Called and spoke with pt and explained that [facility] already saw him for this issue and referred this pt to see [facility] for his right ankle/leg bc she believes that he can better treat the pt for these issues VS [facility]. He gave verbal understanding.

## 2022-08-26 ENCOUNTER — OFFICE VISIT (OUTPATIENT)
Dept: ORTHOPEDIC SURGERY | Facility: CLINIC | Age: 77
End: 2022-08-26

## 2022-08-26 VITALS — BODY MASS INDEX: 31.74 KG/M2 | TEMPERATURE: 97.1 F | WEIGHT: 234.3 LBS | HEIGHT: 72 IN

## 2022-08-26 DIAGNOSIS — M17.11 ARTHRITIS OF RIGHT KNEE: Primary | ICD-10-CM
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
## 2022-08-26 DIAGNOSIS — Z96.652 STATUS POST TOTAL LEFT KNEE REPLACEMENT: ICD-10-CM

## 2022-08-26 PROCEDURE — 73562 X-RAY EXAM OF KNEE 3: CPT | Performed by: ORTHOPAEDIC SURGERY

## 2022-08-26 PROCEDURE — 99213 OFFICE O/P EST LOW 20 MIN: CPT | Performed by: ORTHOPAEDIC SURGERY

## 2022-08-26 PROCEDURE — 20610 DRAIN/INJ JOINT/BURSA W/O US: CPT | Performed by: ORTHOPAEDIC SURGERY

## 2022-08-26 RX ORDER — METHYLPREDNISOLONE ACETATE 80 MG/ML
80 INJECTION, SUSPENSION INTRA-ARTICULAR; INTRALESIONAL; INTRAMUSCULAR; SOFT TISSUE
Status: COMPLETED | OUTPATIENT
Start: 2022-08-26 | End: 2022-08-26

## 2022-08-26 RX ADMIN — METHYLPREDNISOLONE ACETATE 80 MG: 80 INJECTION, SUSPENSION INTRA-ARTICULAR; INTRALESIONAL; INTRAMUSCULAR; SOFT TISSUE at 09:01

## 2022-08-26 NOTE — PROGRESS NOTES
Patient Name: Eliecer Smith   YOB: 1945  Referring Primary Care Physician: Sergey Lo MD  BMI: Body mass index is 31.78 kg/m².    Chief Complaint:    Chief Complaint   Patient presents with   • Right Knee - Follow-up        HPI:     Eliecer Smith is a 77 y.o. male who presents today for evaluation of   Chief Complaint   Patient presents with   • Right Knee - Follow-up   .  Done had a left total knee years ago this done very well for him.  He says that it on August 8 going [facility] he had a hard fall landed on his right knee.  He says been bothering him since that time.  They were going on a cruise to [place] and it significantly altered their vacation.  He is on Xarelto and he cannot take anti-inflammatories.  He wanted to get it checked      Subjective   Medications:   Home Medications:  Current Outpatient Medications on File Prior to Visit   Medication Sig   • amoxicillin (AMOXIL) 500 MG capsule Take 2,000 mg by mouth.   • cephalexin (KEFLEX) 500 MG capsule Take 1 capsule by mouth 2 (Two) Times a Day.   • diltiaZEM (TIAZAC) 120 MG 24 hr capsule Take 120 mg by mouth.   • finasteride (PROSCAR) 5 MG tablet Take 5 mg by mouth Daily.   • flecainide (TAMBOCOR) 100 MG tablet Take 100 mg by mouth.   • rivaroxaban (XARELTO) 20 MG tablet Take 20 mg by mouth.   • tamsulosin (FLOMAX) 0.4 MG capsule 24 hr capsule Take 1 capsule by mouth daily with breakfast.   • cetirizine (zyrTEC) 10 MG tablet Take 10 mg by mouth Daily.   • fluticasone (FLONASE) 50 MCG/ACT nasal spray 1 spray by Each Nare route As Needed.   • mupirocin (BACTROBAN) 2 % ointment Apply  topically to the appropriate area as directed 3 (Three) Times a Day.   • oxybutynin XL (DITROPAN-XL) 10 MG 24 hr tablet take 1 tablet by mouth once daily     No current facility-administered medications on file prior to visit.     Current Medications:  Scheduled Meds:  Continuous Infusions:No current facility-administered medications for  this visit.    PRN Meds:.    I have reviewed the patient's medical history in detail and updated the computerized patient record.  Review and summarization of old records includes:    Past Medical History:   Diagnosis Date   • Ankle sprain various   • Arthritis    • Arthritis of back 2016   • Bladder disorder    • BPH (benign prostatic hyperplasia)    • Cardiomegaly 08/2016   • Chronic right-sided low back pain without sciatica    • Colon polyps     FOLLOWED BY DR. CITLALLI LAZO   • DDD (degenerative disc disease), lumbar    • ED (erectile dysfunction)    • Hemoptysis 10/2013   • Hemorrhagic otitis externa of right ear 10/06/2016   • Hyperlipidemia    • IFG (impaired fasting glucose)    • Irregular heart beat    • Knee sprain various   • Knee swelling 2000   • Laceration of finger 06/04/2018    LEFT MIDDLE FINGER   • Leg edema, left 07/31/2016    WITH CONTUSION, SEEN AT [facility]   • Lumbosacral disc disease 2016   • OA (osteoarthritis)    • PAF (paroxysmal atrial fibrillation) (HCC) 05/15/2018    ADMITTED TO [place]   • Periarthritis of shoulder 2018   • Pharyngitis 05/02/2016    SEEN AT [facility]   • Pharyngitis 03/21/2016    SEEN AT [facility]   • Pneumonia 10/2010   • Polio 1945   • Rectal bleeding    • Rotator cuff syndrome 2018   • Skin cancer     SQUAMOUS CELL CARCINOMA OF SCALP   • Swimmer's ear of left side 10/04/2016    SEEN AT [facility]   • Tear of meniscus of knee 2000   • Thrombocytopenia (HCC)         Past Surgical History:   Procedure Laterality Date   • CARDIOVERSION N/A 05/17/2018    DR. LUCA AGUIRRE AT [place]   • COLONOSCOPY N/A 01/20/2009    ENTIRE COLON WNL, RESCOPE IN 5-10 YRS, DR. EZEKIEL OLIVEIRA AT [facility]   • COLONOSCOPY N/A 02/03/2004    ENTIRE COLON WNL, DR. EZEKIEL OLIVEIRA AT [facility]   • COLONOSCOPY N/A 07/03/2019    5 MM TUBULAR ADENOMA POLYP IN ILEOCECAL VALVE, 4 MM HYPERPLASTIC POLYP IN SIGMOID, RESCOPE IN 5 YRS, DR. CITLALLI LAZO AT [facility]   • COLONOSCOPY W/ POLYPECTOMY N/A 02/12/2014    7 MM ADENOMATOUS POLYP IN  TRANSVERSE, 5 MM ADENOMATOUS POLYP IN PROXIMAL TRANSVERSE, RESCOPE IN 5 YRS, DR. CITLALLI LAZO AT [facility]   • EYE SURGERY     • JOINT REPLACEMENT   and 2018   • KNEE ARTHROSCOPY     • KNEE ARTHROSCOPY Left 2001    DR. VANESSA FRITZ AT [place]   • SHOULDER SURGERY  2018   • TOTAL KNEE ARTHROPLASTY Left 2002    DR. VANESSA FRITZ AT [place]   • TOTAL SHOULDER REVERSE ARTHROPLASTY Right 2018    DR. PATRICIA PENA AT [place]   • TRIGGER POINT INJECTION  2017        Social History     Occupational History   • Not on file   Tobacco Use   • Smoking status: Former Smoker     Packs/day: 1.00     Years: 15.00     Pack years: 15.00     Types: Cigarettes     Start date: 1961     Quit date: 1976     Years since quittin.6   • Smokeless tobacco: Never Used   Substance and Sexual Activity   • Alcohol use: Yes     Alcohol/week: 6.0 standard drinks     Types: 3 Glasses of wine, 3 Cans of beer per week     Comment: MODERATE AMT   • Drug use: No   • Sexual activity: Not Currently     Partners: Female      Social History     Social History Narrative   • Not on file        Family History   Problem Relation Age of Onset   • Broken bones Father    • Heart attack Brother    • Heart disease Brother        ROS: 14 point review of systems was performed and all other systems were reviewed and are negative except for documented findings in HPI and today's encounter.     Allergies:   Allergies   Allergen Reactions   • Mobic [Meloxicam] Rash     Constitutional:  Denies fever, shaking or chills   Eyes:  Denies change in visual acuity   HENT:  Denies nasal congestion or sore throat   Respiratory:  Denies cough or shortness of breath   Cardiovascular:  Denies chest pain or severe LE edema   GI:  Denies abdominal pain, nausea, vomiting, bloody stools or diarrhea   Musculoskeletal:  Numbness, tingling, pain, or loss of motor function only as noted above in history of present illness.  : Denies painful urination or  "hematuria  Integument:  Denies rash, lesion or ulceration   Neurologic:  Denies headache or focal weakness  Endocrine:  Denies lymphadenopathy  Psych:  Denies confusion or change in mental status   Hem:  Denies active bleeding    OBJECTIVE:  Physical Exam: 77 y.o. male  Wt Readings from Last 3 Encounters:   08/26/22 106 kg (234 lb 4.8 oz)   06/10/21 104 kg (230 lb)   02/11/21 104 kg (229 lb 4.5 oz)     Ht Readings from Last 1 Encounters:   08/26/22 182.9 cm (72\")     Body mass index is 31.78 kg/m².  Vitals:    08/26/22 0833   Temp: 97.1 °F (36.2 °C)     Vital signs reviewed.     General Appearance:    Alert, cooperative, in no acute distress                  Eyes: conjunctiva clear  ENT: external ears and nose atraumatic  CV: no peripheral edema  Resp: normal respiratory effort  Skin: no rashes or wounds; normal turgor  Psych: mood and affect appropriate  Lymph: no nodes appreciated  Neuro: gross sensation intact  Vascular:  Palpable peripheral pulse in noted extremity  Musculoskeletal Extremities: Damion shows pleasant gentleman he has joint line tenderness medially and laterally with some palpable osteophytes he can do straight leg raise and ability defects in his patellar quadriceps tendons and he has good stability he walks gingerly    Radiology:   P lateral 40 degree PA x-ray right knee taken the office today for complaints of pain with comparison view show arthritis        Assessment:     ICD-10-CM ICD-9-CM   1. Arthritis of right knee  M17.11 716.96   2. Accidental fall, initial encounter  W19.XXXA E888.9   3. Status post total left knee replacement  Z96.652 V43.65        MDM/Plan:   The diagnosis(es), natural history, pathophysiology and treatment for diagnosis(es) were discussed. Opportunity given and questions answered.  Biomechanics of pertinent body areas discussed.  When appropriate, the use of ambulatory aids discussed.    MEDICATIONS:  The risks, benefits, warnings,side effects and alternatives of " medications discussed.  Inflammation/pain control; with cold, heat, elevation and/or liniments discussed as appropriate  Cortisone Injection. See procedure note.  MEDICAL RECORDS reviewed from other provider(s) for past and current medical history pertinent to this complaint.  Possibility of some physical therapy if he does not get better.  Cautioned him about taking nonsteroidal anti-inflammatory drugs with him being on Xarelto and recommended Tylenol and ice see him back if he is not responding quickly    8/26/2022    Dictated utilizing Dragon dictation        Large Joint Arthrocentesis: R knee  Date/Time: 8/26/2022 9:01 AM  Consent given by: patient  Site marked: site marked  Timeout: Immediately prior to procedure a time out was called to verify the correct patient, procedure, equipment, support staff and site/side marked as required   Supporting Documentation  Indications: pain, joint swelling and diagnostic evaluation   Procedure Details  Location: knee - R knee  Preparation: Patient was prepped and draped in the usual sterile fashion  Needle gauge: 21G.  Medications administered: 80 mg methylPREDNISolone acetate 80 MG/ML; 2 mL lidocaine (cardiac)  Patient tolerance: patient tolerated the procedure well with no immediate complications

## 2023-04-10 ENCOUNTER — OFFICE VISIT (OUTPATIENT)
Dept: ORTHOPEDIC SURGERY | Facility: CLINIC | Age: 78
End: 2023-04-10
Payer: MEDICARE

## 2023-04-10 VITALS — BODY MASS INDEX: 32.46 KG/M2 | HEIGHT: 73 IN | WEIGHT: 244.9 LBS | TEMPERATURE: 97.3 F

## 2023-04-10 DIAGNOSIS — R52 PAIN: Primary | ICD-10-CM

## 2023-04-10 DIAGNOSIS — M17.11 ARTHRITIS OF RIGHT KNEE: ICD-10-CM

## 2023-04-10 DIAGNOSIS — Z96.652 STATUS POST TOTAL LEFT KNEE REPLACEMENT: ICD-10-CM

## 2023-04-10 PROBLEM — E66.9 OBESITY (BMI 30.0-34.9): Status: ACTIVE | Noted: 2023-04-10

## 2023-04-10 PROBLEM — E66.811 OBESITY (BMI 30.0-34.9): Status: ACTIVE | Noted: 2023-04-10

## 2023-04-10 PROCEDURE — 99214 OFFICE O/P EST MOD 30 MIN: CPT | Performed by: ORTHOPAEDIC SURGERY

## 2023-04-10 PROCEDURE — 73562 X-RAY EXAM OF KNEE 3: CPT | Performed by: ORTHOPAEDIC SURGERY

## 2023-04-10 NOTE — PROGRESS NOTES
Patient Name: Eliecer Smith   YOB: 1945  Referring Primary Care Physician: Sergey Lo MD  BMI: Body mass index is 32.31 kg/m².    Chief Complaint:    Chief Complaint   Patient presents with   • Right Knee - Follow-up, Pain        HPI:     Eliecer Smith is a 78 y.o. male who presents today for evaluation of   Chief Complaint   Patient presents with   • Right Knee - Follow-up, Pain   .  Jason is seen today complaining of some right knee pain.  He has been in [place] for the winter near [place].  He said that plays golf etc. bothers him.  Gets a little stiff.  About 2 months ago he saw a doctor in [place] who aspirated his knee and injected it and it helped some.  We saw him back after a fall in August injected about September and he did well throughout the winter for total of about 5 months.      Subjective   Medications:   Home Medications:  Current Outpatient Medications on File Prior to Visit   Medication Sig   • amoxicillin (AMOXIL) 500 MG capsule Take 4 capsules by mouth.   • diltiaZEM (TIAZAC) 120 MG 24 hr capsule Take 1 capsule by mouth.   • finasteride (PROSCAR) 5 MG tablet Take 1 tablet by mouth Daily.   • flecainide (TAMBOCOR) 100 MG tablet Take 1 tablet by mouth.   • rivaroxaban (XARELTO) 20 MG tablet Take 1 tablet by mouth.   • tamsulosin (FLOMAX) 0.4 MG capsule 24 hr capsule Take 1 capsule by mouth Daily With Breakfast.   • cephalexin (KEFLEX) 500 MG capsule Take 1 capsule by mouth 2 (Two) Times a Day.   • cetirizine (zyrTEC) 10 MG tablet Take 10 mg by mouth Daily.   • fluticasone (FLONASE) 50 MCG/ACT nasal spray 1 spray by Each Nare route As Needed.   • mupirocin (BACTROBAN) 2 % ointment Apply  topically to the appropriate area as directed 3 (Three) Times a Day.   • oxybutynin XL (DITROPAN-XL) 10 MG 24 hr tablet take 1 tablet by mouth once daily     No current facility-administered medications on file prior to visit.     Current Medications:  Scheduled Meds:  Continuous  Infusions:No current facility-administered medications for this visit.    PRN Meds:.    I have reviewed the patient's medical history in detail and updated the computerized patient record.  Review and summarization of old records includes:    Past Medical History:   Diagnosis Date   • Ankle sprain various   • Arthritis    • Arthritis of back 2016   • Bladder disorder    • BPH (benign prostatic hyperplasia)    • Cardiomegaly 08/2016   • Chronic right-sided low back pain without sciatica    • Colon polyps     FOLLOWED BY DR. CITLALLI LAZO   • DDD (degenerative disc disease), lumbar    • ED (erectile dysfunction)    • Frozen shoulder    • Hemoptysis 10/2013   • Hemorrhagic otitis externa of right ear 10/06/2016   • Hyperlipidemia    • IFG (impaired fasting glucose)    • Irregular heart beat    • Knee sprain various   • Knee swelling 2000   • Laceration of finger 06/04/2018    LEFT MIDDLE FINGER   • Leg edema, left 07/31/2016    WITH CONTUSION, SEEN AT [facility] ER   • Lumbosacral disc disease 2016   • OA (osteoarthritis)    • PAF (paroxysmal atrial fibrillation) 05/15/2018    ADMITTED TO [place]   • Periarthritis of shoulder 2018   • Pharyngitis 05/02/2016    SEEN AT [facility]   • Pharyngitis 03/21/2016    SEEN AT [facility]   • Pneumonia 10/2010   • Polio 1945   • Rectal bleeding    • Rotator cuff syndrome 2018   • Skin cancer     SQUAMOUS CELL CARCINOMA OF SCALP   • Swimmer's ear of left side 10/04/2016    SEEN AT [facility]   • Tear of meniscus of knee 2000   • Thrombocytopenia         Past Surgical History:   Procedure Laterality Date   • CARDIOVERSION N/A 05/17/2018    DR. LUCA AGUIRRE AT [place]   • COLONOSCOPY N/A 01/20/2009    ENTIRE COLON WNL, RESCOPE IN 5-10 YRS, DR. EZEKIEL OLIVEIRA AT [facility]   • COLONOSCOPY N/A 02/03/2004    ENTIRE COLON WNL, DR. EZEKIEL OLIVEIRA AT [facility]   • COLONOSCOPY N/A 07/03/2019    5 MM TUBULAR ADENOMA POLYP IN ILEOCECAL VALVE, 4 MM HYPERPLASTIC POLYP IN SIGMOID, RESCOPE IN 5 YRS, DR. CITLALLI LAZO AT [facility]   •  COLONOSCOPY W/ POLYPECTOMY N/A 2014    7 MM ADENOMATOUS POLYP IN TRANSVERSE, 5 MM ADENOMATOUS POLYP IN PROXIMAL TRANSVERSE, RESCOPE IN 5 YRS, DR. CITLALLI LAZO AT [facility]   • EYE SURGERY     • JOINT REPLACEMENT   and    • KNEE ARTHROSCOPY     • KNEE ARTHROSCOPY Left 2001    DR. VANESSA FRITZ AT [place]   • SHOULDER SURGERY     • TOTAL KNEE ARTHROPLASTY Left 2002    DR. VANESSA FRITZ AT [place]   • TOTAL SHOULDER REVERSE ARTHROPLASTY Right 2018    DR. PATRICIA PENA AT [place]   • TRIGGER POINT INJECTION  2017        Social History     Occupational History   • Not on file   Tobacco Use   • Smoking status: Former     Packs/day: 1.00     Years: 15.00     Pack years: 15.00     Types: Cigarettes     Start date: 1961     Quit date: 1976     Years since quittin.2   • Smokeless tobacco: Never   Vaping Use   • Vaping Use: Never used   Substance and Sexual Activity   • Alcohol use: Yes     Alcohol/week: 6.0 standard drinks     Types: 3 Glasses of wine, 3 Cans of beer per week     Comment: glasses/cans are on a weekly basis   • Drug use: No   • Sexual activity: Not Currently     Partners: Female      Social History     Social History Narrative   • Not on file        Family History   Problem Relation Age of Onset   • Broken bones Father    • Heart attack Brother    • Heart disease Brother        ROS: 14 point review of systems was performed and all other systems were reviewed and are negative except for documented findings in HPI and today's encounter.     Allergies:   Allergies   Allergen Reactions   • Mobic [Meloxicam] Rash     Constitutional:  Denies fever, shaking or chills   Eyes:  Denies change in visual acuity   HENT:  Denies nasal congestion or sore throat   Respiratory:  Denies cough or shortness of breath   Cardiovascular:  Denies chest pain or severe LE edema   GI:  Denies abdominal pain, nausea, vomiting, bloody stools or diarrhea   Musculoskeletal:  Numbness, tingling, pain,  "or loss of motor function only as noted above in history of present illness.  : Denies painful urination or hematuria  Integument:  Denies rash, lesion or ulceration   Neurologic:  Denies headache or focal weakness  Endocrine:  Denies lymphadenopathy  Psych:  Denies confusion or change in mental status   Hem:  Denies active bleeding    OBJECTIVE:  Physical Exam: 78 y.o. male  Wt Readings from Last 3 Encounters:   04/10/23 111 kg (244 lb 14.4 oz)   08/26/22 106 kg (234 lb 4.8 oz)   06/10/21 104 kg (230 lb)     Ht Readings from Last 1 Encounters:   04/10/23 185.4 cm (73\")     Body mass index is 32.31 kg/m².  Vitals:    04/10/23 0807   Temp: 97.3 °F (36.3 °C)     Vital signs reviewed.     General Appearance:    Alert, cooperative, in no acute distress                  Eyes: conjunctiva clear  ENT: external ears and nose atraumatic  CV: no peripheral edema  Resp: normal respiratory effort  Skin: no rashes or wounds; normal turgor  Psych: mood and affect appropriate  Lymph: no nodes appreciated  Neuro: gross sensation intact  Vascular:  Palpable peripheral pulse in noted extremity  Musculoskeletal Extremities: Damion today he had a left knee replacement done many years ago and scars faded and he has good range of motion and no pain his right knee has swelling crepitation synovitis some effusion and slight Baker's cyst he has some bipedal ankle type edema that he has had for a while.  He has joint line tenderness    Radiology:   AP lateral 40 degree PA x-ray right knee taken the office today for complaints of pain with comparison views show advanced arthritic change        Assessment:     ICD-10-CM ICD-9-CM   1. Pain  R52 780.96   2. Arthritis of right knee  M17.11 716.96   3. Status post total left knee replacement  Z96.652 V43.65        MDM/Plan:   The diagnosis(es), natural history, pathophysiology and treatment for diagnosis(es) were discussed. Opportunity given and questions answered.  Biomechanics of pertinent body " areas discussed.  When appropriate, the use of ambulatory aids discussed.    Biomechanics of pertinent body areas discussed.  When appropriate, the use of ambulatory aids discussed.  BMI:  The concept of BMI body mass index and its importance and implications discussed.    EXERCISES:  Advice on benefits of, and types of regular/moderate exercise pertaining to orthopedic diagnosis(es).  MEDICATIONS:  The risks, benefits, warnings,side effects and alternatives of medications discussed.  Inflammation/pain control; with cold, heat, elevation and/or liniments discussed as appropriate  PT referral.  MEDICAL RECORDS reviewed from other provider(s) for past and current medical history pertinent to this complaint.  Try some physical therapy and inflammation control we will see his back in about a month and we could reinject him if he needed it we also talked about the possibility of surgery should his symptoms justify    4/10/2023    Dictated utilizing Dragon dictation

## 2023-05-03 ENCOUNTER — TELEPHONE (OUTPATIENT)
Dept: ORTHOPEDIC SURGERY | Facility: CLINIC | Age: 78
End: 2023-05-03

## 2023-05-03 ENCOUNTER — OFFICE VISIT (OUTPATIENT)
Dept: ORTHOPEDIC SURGERY | Facility: CLINIC | Age: 78
End: 2023-05-03
Payer: MEDICARE

## 2023-05-03 VITALS — HEIGHT: 72 IN | TEMPERATURE: 97.1 F | WEIGHT: 241.2 LBS | BODY MASS INDEX: 32.67 KG/M2

## 2023-05-03 DIAGNOSIS — M17.11 ARTHRITIS OF RIGHT KNEE: Primary | ICD-10-CM

## 2023-05-03 RX ORDER — LIDOCAINE HYDROCHLORIDE 10 MG/ML
2 INJECTION, SOLUTION EPIDURAL; INFILTRATION; INTRACAUDAL; PERINEURAL
Status: COMPLETED | OUTPATIENT
Start: 2023-05-03 | End: 2023-05-03

## 2023-05-03 RX ORDER — METHYLPREDNISOLONE ACETATE 80 MG/ML
80 INJECTION, SUSPENSION INTRA-ARTICULAR; INTRALESIONAL; INTRAMUSCULAR; SOFT TISSUE
Status: COMPLETED | OUTPATIENT
Start: 2023-05-03 | End: 2023-05-03

## 2023-05-03 RX ADMIN — LIDOCAINE HYDROCHLORIDE 2 ML: 10 INJECTION, SOLUTION EPIDURAL; INFILTRATION; INTRACAUDAL; PERINEURAL at 09:07

## 2023-05-03 RX ADMIN — METHYLPREDNISOLONE ACETATE 80 MG: 80 INJECTION, SUSPENSION INTRA-ARTICULAR; INTRALESIONAL; INTRAMUSCULAR; SOFT TISSUE at 09:07

## 2023-05-03 NOTE — TELEPHONE ENCOUNTER
Caller: Eliecer Smith    Relationship: Self    Best call back number:    Who are you requesting to speak with (clinical staff, provider,  specific staff member): DR FRITZ/CLINICAL    What was the call regarding: THE PATIENT WOULD LIKE TO KNOW IF HE SHOULD CONTINUE WITH PHYSICAL THERAPY FOR HIS RIGHT KNEE.    Do you require a callback: YES

## 2023-05-03 NOTE — PROGRESS NOTES
Jason is seen back today.  He said his knee was doing better he had had it aspirated in [place] but says the swelling caused him problems again on the right he had a left total knee replacement in the past.  He has pain with ambulation and it is not swollen and bother him he denies any fevers or chills exam today shows large effusion and synovitis in his right knee and somewhat stiff but he has good stability.  Previous x-rays show arthritis.  Plan we prepped him using 5 cc of lidocaine we were able to aspirate about 85 cc of slightly blood-tinged material and injected him with corticosteroids.  A wrap was applied we will see how he does with this talked about inflammation control.  See his back when he needs another injection or if it is bothering him.  I did talked about the possibly knee replacement surgeries should he so desire going forward in the future depending on his response to nonoperative therapies      Large Joint Arthrocentesis: R knee  Date/Time: 5/3/2023 9:07 AM  Consent given by: patient  Site marked: site marked  Timeout: Immediately prior to procedure a time out was called to verify the correct patient, procedure, equipment, support staff and site/side marked as required   Supporting Documentation  Indications: pain   Procedure Details  Location: knee - R knee  Preparation: Patient was prepped and draped in the usual sterile fashion  Needle gauge: 21g.  Approach: superior  Medications administered: 80 mg methylPREDNISolone acetate 80 MG/ML; 2 mL lidocaine PF 1% 1 %  Aspirate amount: 85 mL  Aspirate: blood-tinged  Patient tolerance: patient tolerated the procedure well with no immediate complications

## 2024-05-15 ENCOUNTER — TELEPHONE (OUTPATIENT)
Dept: SURGERY | Facility: CLINIC | Age: 79
End: 2024-05-15
Payer: MEDICARE

## 2024-05-15 ENCOUNTER — PREP FOR SURGERY (OUTPATIENT)
Dept: OTHER | Facility: HOSPITAL | Age: 79
End: 2024-05-15
Payer: MEDICARE

## 2024-05-15 DIAGNOSIS — Z86.010 HISTORY OF COLON POLYPS: Primary | ICD-10-CM

## 2024-05-15 NOTE — TELEPHONE ENCOUNTER
Message was left on patients voicemail to call Jordana at the office.  I was calling to schedule a colonoscopy.  Patient was given my private line number to my office to call.

## 2024-10-30 ENCOUNTER — ANESTHESIA EVENT (OUTPATIENT)
Dept: GASTROENTEROLOGY | Facility: HOSPITAL | Age: 79
End: 2024-10-30
Payer: MEDICARE

## 2024-10-30 ENCOUNTER — HOSPITAL ENCOUNTER (OUTPATIENT)
Facility: HOSPITAL | Age: 79
Setting detail: HOSPITAL OUTPATIENT SURGERY
Discharge: HOME | End: 2024-10-30
Attending: COLON & RECTAL SURGERY | Admitting: COLON & RECTAL SURGERY
Payer: MEDICARE

## 2024-10-30 ENCOUNTER — ANESTHESIA (OUTPATIENT)
Dept: GASTROENTEROLOGY | Facility: HOSPITAL | Age: 79
End: 2024-10-30
Payer: MEDICARE

## 2024-10-30 VITALS
BODY MASS INDEX: 31.94 KG/M2 | HEIGHT: 72 IN | RESPIRATION RATE: 16 BRPM | SYSTOLIC BLOOD PRESSURE: 109 MMHG | WEIGHT: 235.8 LBS | OXYGEN SATURATION: 100 % | DIASTOLIC BLOOD PRESSURE: 84 MMHG | HEART RATE: 91 BPM

## 2024-10-30 DIAGNOSIS — Z86.0100 HISTORY OF COLON POLYPS: ICD-10-CM

## 2024-10-30 PROBLEM — K57.90 DIVERTICULOSIS: Status: ACTIVE | Noted: 2024-10-30

## 2024-10-30 PROCEDURE — 25010000002 PROPOFOL 10 MG/ML EMULSION: Performed by: NURSE ANESTHETIST, CERTIFIED REGISTERED

## 2024-10-30 PROCEDURE — 25010000002 PHENYLEPHRINE 10 MG/ML SOLUTION: Performed by: NURSE ANESTHETIST, CERTIFIED REGISTERED

## 2024-10-30 PROCEDURE — 88305 TISSUE EXAM BY PATHOLOGIST: CPT | Performed by: STUDENT IN AN ORGANIZED HEALTH CARE EDUCATION/TRAINING PROGRAM

## 2024-10-30 PROCEDURE — 25810000003 SODIUM CHLORIDE 0.9 % SOLUTION: Performed by: COLON & RECTAL SURGERY

## 2024-10-30 PROCEDURE — S0260 H&P FOR SURGERY: HCPCS | Performed by: STUDENT IN AN ORGANIZED HEALTH CARE EDUCATION/TRAINING PROGRAM

## 2024-10-30 PROCEDURE — 45380 COLONOSCOPY AND BIOPSY: CPT | Performed by: STUDENT IN AN ORGANIZED HEALTH CARE EDUCATION/TRAINING PROGRAM

## 2024-10-30 PROCEDURE — 25010000002 LIDOCAINE 2% SOLUTION: Performed by: NURSE ANESTHETIST, CERTIFIED REGISTERED

## 2024-10-30 RX ORDER — LIDOCAINE HYDROCHLORIDE 20 MG/ML
INJECTION, SOLUTION INFILTRATION; PERINEURAL AS NEEDED
Status: DISCONTINUED | OUTPATIENT
Start: 2024-10-30 | End: 2024-10-30 | Stop reason: SURG

## 2024-10-30 RX ORDER — PROPOFOL 10 MG/ML
VIAL (ML) INTRAVENOUS AS NEEDED
Status: DISCONTINUED | OUTPATIENT
Start: 2024-10-30 | End: 2024-10-30 | Stop reason: SURG

## 2024-10-30 RX ORDER — SODIUM CHLORIDE 9 MG/ML
30 INJECTION, SOLUTION INTRAVENOUS CONTINUOUS PRN
Status: DISCONTINUED | OUTPATIENT
Start: 2024-10-30 | End: 2024-10-30 | Stop reason: HOSPADM

## 2024-10-30 RX ORDER — PHENYLEPHRINE HYDROCHLORIDE 10 MG/ML
INJECTION INTRAVENOUS AS NEEDED
Status: DISCONTINUED | OUTPATIENT
Start: 2024-10-30 | End: 2024-10-30 | Stop reason: SURG

## 2024-10-30 RX ADMIN — PROPOFOL 150 MG: 10 INJECTION, EMULSION INTRAVENOUS at 11:49

## 2024-10-30 RX ADMIN — SODIUM CHLORIDE 30 ML/HR: 9 INJECTION, SOLUTION INTRAVENOUS at 10:44

## 2024-10-30 RX ADMIN — METOPROLOL TARTRATE 2 MG: 5 INJECTION, SOLUTION INTRAVENOUS at 11:54

## 2024-10-30 RX ADMIN — PROPOFOL 100 MCG/KG/MIN: 10 INJECTION, EMULSION INTRAVENOUS at 11:54

## 2024-10-30 RX ADMIN — LIDOCAINE HYDROCHLORIDE 80 MG: 20 INJECTION, SOLUTION INFILTRATION; PERINEURAL at 11:49

## 2024-10-30 RX ADMIN — PHENYLEPHRINE HYDROCHLORIDE 100 MCG: 10 INJECTION INTRAVENOUS at 12:40

## 2024-10-30 NOTE — ANESTHESIA POSTPROCEDURE EVALUATION
Patient: Eliecer Smith    Procedure Summary       Date: 10/30/24 Room / Location: [facility] ENDOSCOPY 6 /  [facility]    Anesthesia Start: 1147 Anesthesia Stop: 1302    Procedure: COLONOSCOPY TO CECUM WITH POLYPECTOMY( COLD BX) Diagnosis:       History of colon polyps      (History of colon polyps [Z86.010])    Surgeons: Mohinder Earl MD Provider: Ric Spencer MD    Anesthesia Type: MAC ASA Status: 3            Anesthesia Type: MAC    Vitals  Vitals Value Taken Time   /84 10/30/24 1316   Temp     Pulse 94 10/30/24 1316   Resp 16 10/30/24 1315   SpO2 98 % 10/30/24 1316   Vitals shown include unfiled device data.        Post Anesthesia Care and Evaluation    Patient location during evaluation: PACU  Patient participation: complete - patient participated  Level of consciousness: awake and alert  Pain management: adequate    Airway patency: patent  Anesthetic complications: No anesthetic complications    Cardiovascular status: acceptable  Respiratory status: acceptable  Hydration status: acceptable    Comments: --------------------            10/30/24               1315     --------------------   BP:       109/84     Pulse:      91       Resp:       16       SpO2:      100%     --------------------

## 2024-10-30 NOTE — DISCHARGE INSTRUCTIONS
For the next 24 hours patient needs to be with a responsible adult.    For 24 hours DO NOT drive, operate machinery, appliances, drink alcohol, make important decisions or sign legal documents.    Start with a light or bland diet if you are feeling sick to your stomach otherwise advance to regular diet as tolerated.    Follow recommendations on procedure report if provided by your doctor.    Call Dr Earl for problems [phone]    Problems may include but not limited to: large amounts of bleeding, trouble breathing, repeated vomiting, severe unrelieved pain, fever or chills.

## 2024-10-30 NOTE — H&P
General Surgery  History and Physical    Patient: Eliecer Smith  YOB: 1945  MRN: [id number]      Assessment  Eliecer Smith is a 79 y.o. male with personal history of colon polyps here for colonoscopy.     Plan  Proceed with colonoscopy      History of Present Illness   Eliecer Smith is a 79 y.o. male with history of polyp at ileocecal valve. Denies blood in his stool, unintentional weight loss and family history of colon cancer. He is on xarelto.     Past Medical History   Past Medical History:   Diagnosis Date    Ankle sprain various    Arthritis     Arthritis of back 2016    Bladder disorder     BPH (benign prostatic hyperplasia)     Cardiomegaly 08/2016    Chronic right-sided low back pain without sciatica     Colon polyps     FOLLOWED BY DR. CITLALLI LAZO    DDD (degenerative disc disease), lumbar     ED (erectile dysfunction)     Frozen shoulder     Hemoptysis 10/2013    Hemorrhagic otitis externa of right ear 10/06/2016    Hyperlipidemia     IFG (impaired fasting glucose)     Irregular heart beat     Knee sprain various    Knee swelling 2000    Laceration of finger 06/04/2018    LEFT MIDDLE FINGER    Leg edema, left 07/31/2016    WITH CONTUSION, SEEN AT [facility] ER    Lumbosacral disc disease 2016    OA (osteoarthritis)     PAF (paroxysmal atrial fibrillation) 05/15/2018    ADMITTED TO [place]    Periarthritis of shoulder 2018    Pharyngitis 05/02/2016    SEEN AT [facility]    Pharyngitis 03/21/2016    SEEN AT [facility]    Pneumonia 10/2010    Polio 1945    Rectal bleeding     Rotator cuff syndrome 2018    Skin cancer     SQUAMOUS CELL CARCINOMA OF SCALP    Swimmer's ear of left side 10/04/2016    SEEN AT [facility]    Tear of meniscus of knee 2000    Thrombocytopenia         Past Surgical History   Past Surgical History:   Procedure Laterality Date    CARDIOVERSION N/A 05/17/2018    DR. LUCA AGUIRRE AT [place]    COLONOSCOPY N/A 01/20/2009    ENTIRE COLON WNL, RESCOPE IN 5-10 YRS, DR. EZEKIEL OLIVEIRA AT [facility]     COLONOSCOPY N/A 2004    ENTIRE COLON WNL, DR. EZEKIEL OLIVEIRA AT [facility]    COLONOSCOPY N/A 2019    5 MM TUBULAR ADENOMA POLYP IN ILEOCECAL VALVE, 4 MM HYPERPLASTIC POLYP IN SIGMOID, RESCOPE IN 5 YRS, DR. CITLALLI GONZALEZ AT [facility]    COLONOSCOPY W/ POLYPECTOMY N/A 2014    7 MM ADENOMATOUS POLYP IN TRANSVERSE, 5 MM ADENOMATOUS POLYP IN PROXIMAL TRANSVERSE, RESCOPE IN 5 YRS, DR. CITLALLI GONZALEZ AT [facility]    EYE SURGERY      JOINT REPLACEMENT   and     KNEE ARTHROSCOPY  1993    KNEE ARTHROSCOPY Left 2001    DR. VANESSA FRITZ AT [place]    SHOULDER SURGERY  2018    TOTAL KNEE ARTHROPLASTY Left 2002    DR. VANESSA FRITZ AT [place]    TOTAL SHOULDER REVERSE ARTHROPLASTY Right 2018    DR. PATRICIA PENA AT [place]    TRIGGER POINT INJECTION  2017       Social History  Social History     Socioeconomic History    Marital status:    Tobacco Use    Smoking status: Former     Current packs/day: 0.00     Average packs/day: 1 pack/day for 15.0 years (15.0 ttl pk-yrs)     Types: Cigarettes     Start date: 1961     Quit date: 1976     Years since quittin.8    Smokeless tobacco: Never   Vaping Use    Vaping status: Never Used   Substance and Sexual Activity    Alcohol use: Yes     Alcohol/week: 6.0 standard drinks of alcohol     Types: 3 Glasses of wine, 3 Cans of beer per week     Comment: glasses/cans are on a weekly basis    Drug use: No    Sexual activity: Not Currently     Partners: Female       Family History  Family History   Problem Relation Age of Onset    Broken bones Father     Heart attack Brother     Heart disease Brother        Allergies  Allergies   Allergen Reactions    Mobic [Meloxicam] Rash       Medications    Current Facility-Administered Medications:     sodium chloride 0.9 % infusion, 30 mL/hr, Intravenous, Continuous PRN, Citlalli Gonzalez MD, Last Rate: 30 mL/hr at 10/30/24 1044, 30 mL/hr at 10/30/24 1044    Vital Signs  Vitals:    10/30/24 1035   BP: 117/88   Pulse:  115   Resp: 14   SpO2: 96%        Physical Exam  General: not sick, awake and alert  Cardiac: normal rate, no JVD, no peripheral edema  Respiratory: nonlabored breathing on room air  Abdomen: soft, nontender, nondistended, no guarding  Skin: no jaundice, rash, or lesions visualized         BMI is >= 30 and <35. (Class 1 Obesity). The following options were offered after discussion;: exercise counseling/recommendations and nutrition counseling/recommendations       Mohinder Earl MD  General Surgery  [facility] [place]    [address] [place] [address], [address]  [place], [place], [address]  P: [phone]  F: [phone]

## 2024-10-30 NOTE — ANESTHESIA PREPROCEDURE EVALUATION
Anesthesia Evaluation     Patient summary reviewed and Nursing notes reviewed                Airway   Mallampati: II  Dental      Pulmonary    (+) a smoker Former,  Cardiovascular     PT is on anticoagulation therapy  Patient on routine beta blocker  Rhythm: regular  Rate: normal    (+) dysrhythmias Paroxysmal Atrial Fib, hyperlipidemia      Neuro/Psych- negative ROS  GI/Hepatic/Renal/Endo    (+) obesity, GI bleeding     Musculoskeletal     Abdominal    Substance History - negative use     OB/GYN negative ob/gyn ROS         Other   arthritis,   history of cancer (skin)                  Anesthesia Plan    ASA 3     MAC     (PAF currently in NSR)  intravenous induction     Anesthetic plan, risks, benefits, and alternatives have been provided, discussed and informed consent has been obtained with: patient.    CODE STATUS:

## 2024-10-30 NOTE — OP NOTE
Colonoscopy Procedure Note  Eliecer Smith  1945  Date of Procedure: 10/30/24    Pre-operative Diagnosis:    Personal history of colon polyps    Post-operative Diagnosis:  4mm sessile ascending colon polyp  Sigmoid diverticulosis  Anal skin tag    Procedure: Colonoscopy with biopsy      Findings/Treatments:   4mm sessile ascending colon polyp removed with cold biopsy forceps   Multiple narrow and wide mouth diverticula  Anal skin tag       Recommendations:   Interval colonoscopy in 5 years or sooner pending pathology    Surgeon: Mohinder Earl MD    Anesthetic: MAC per Ric Spencer MD      Procedure Details:    MAC anesthesia was induced.  A digital rectal exam was performed along with visual inspection of the anus. The colonoscope was inserted into the rectum and advanced to the cecum, with relative ease.  The cecum was identified by the appendiceal orifice and the ileocecal valve and photographed for documentation.      A careful inspection was made as the scope was withdrawn, including a retroflexed view of the rectum. A 4mm sessile polyp in the ascending colon was removed with cold biopsy forceps. Multiple wide and narrow mouth diverticula were observedc in the sigmoid colon. There was a small skin tag just distal to the dentate line. Retroflexion in the rectum revealed normal anorectal mucosa. Prep quality was good.      Mohinder Earl M.D.  General Surgery  [facility]    [address], [address]  [place], [place], [address]  P: [phone]  F: [phone]

## 2024-11-01 LAB
CYTO UR: NORMAL
LAB AP CASE REPORT: NORMAL
PATH REPORT.FINAL DX SPEC: NORMAL
PATH REPORT.GROSS SPEC: NORMAL

## 2024-11-05 ENCOUNTER — TELEPHONE (OUTPATIENT)
Dept: SURGERY | Facility: CLINIC | Age: 79
End: 2024-11-05
Payer: MEDICARE

## 2024-11-19 ENCOUNTER — OFFICE VISIT (OUTPATIENT)
Dept: ORTHOPEDIC SURGERY | Facility: CLINIC | Age: 79
End: 2024-11-19
Payer: MEDICARE

## 2024-11-19 ENCOUNTER — TELEPHONE (OUTPATIENT)
Dept: ORTHOPEDIC SURGERY | Facility: CLINIC | Age: 79
End: 2024-11-19

## 2024-11-19 VITALS — BODY MASS INDEX: 31.83 KG/M2 | TEMPERATURE: 98.4 F | HEIGHT: 72 IN | WEIGHT: 235 LBS

## 2024-11-19 DIAGNOSIS — R52 PAIN: Primary | ICD-10-CM

## 2024-11-19 DIAGNOSIS — M17.11 PRIMARY OSTEOARTHRITIS OF RIGHT KNEE: ICD-10-CM

## 2024-11-19 DIAGNOSIS — M79.661 RIGHT CALF PAIN: ICD-10-CM

## 2024-11-19 PROCEDURE — 1160F RVW MEDS BY RX/DR IN RCRD: CPT | Performed by: NURSE PRACTITIONER

## 2024-11-19 PROCEDURE — 73562 X-RAY EXAM OF KNEE 3: CPT | Performed by: NURSE PRACTITIONER

## 2024-11-19 PROCEDURE — 1159F MED LIST DOCD IN RCRD: CPT | Performed by: NURSE PRACTITIONER

## 2024-11-19 PROCEDURE — 99213 OFFICE O/P EST LOW 20 MIN: CPT | Performed by: NURSE PRACTITIONER

## 2024-11-19 NOTE — TELEPHONE ENCOUNTER
Hub staff attempted to follow warm transfer process and was unsuccessful     Caller: Eliecer Smith    Relationship to patient: Self    Best call back number: [phone] (home)     Patient is needing: MR SMITH IS RETURNING JAQUELINE'S CALL. HE WOULD LIKE TO CANCEL THE DOPPLER THAT IS SCHEDULED FOR TODAY AND RESCHEDULE IT FOR FRIDAY 11/22/24

## 2024-11-19 NOTE — PROGRESS NOTES
Answers submitted by the patient for this visit:  Primary Reason for Visit (Submitted on 11/19/2024)  What is the primary reason for your visit?: Extremity Pain  Lower Extremity Injury Questionnaire (Submitted on 11/19/2024)  Chief Complaint: Extremity pain  Injury: No  Pain location: right knee  Pain quality: aching  Pain - numeric: 6/10  Progression since onset: comes and goes  tingling: No  inability to bear weight: No  numbness: No  lower extremity swelling: Yes  redness: Yes  Patient Name: Eliecer Smith   YOB: 1945  Referring Primary Care Physician: Sergey Lo MD  BMI: Body mass index is 31.87 kg/m².    Chief Complaint:    Chief Complaint   Patient presents with    Right Knee - Pain        HPI: Fell in [place] on a river cruise and hit his right knee 3 days ago. Pt fell and hit his right knee and has swelling. Pt used a wheelchair and cane to get home.   He is on xarelto for a fib and has swelling and pain in the right calf.  Patient of [facility] has a total knee arthroplasty on the left.  Recent spinal fusion gum    Eliecer Smith is a 79 y.o. male who presents today for evaluation of   Chief Complaint   Patient presents with    Right Knee - Pain         Subjective   Medications:   Home Medications:  Current Outpatient Medications on File Prior to Visit   Medication Sig    diltiaZEM (TIAZAC) 120 MG 24 hr capsule Take 1 capsule by mouth.    finasteride (PROSCAR) 5 MG tablet Take 1 tablet by mouth Daily.    metoprolol tartrate (LOPRESSOR) 25 MG tablet Take 1 tablet by mouth 2 (Two) Times a Day.    mupirocin (BACTROBAN) 2 % ointment Apply  topically to the appropriate area as directed 3 (Three) Times a Day.    rivaroxaban (XARELTO) 20 MG tablet Take 1 tablet by mouth.    tamsulosin (FLOMAX) 0.4 MG capsule 24 hr capsule Take 1 capsule by mouth Daily With Breakfast.    Vibegron (Gemtesa) 75 MG tablet Take  by mouth.    Acetaminophen (TYLENOL PO) Take  by mouth As Needed.    cetirizine (zyrTEC) 10  MG tablet Take 1 tablet by mouth Daily.    flecainide (TAMBOCOR) 100 MG tablet Take 1 tablet by mouth. (Patient not taking: Reported on 11/19/2024)    fluticasone (FLONASE) 50 MCG/ACT nasal spray 1 spray by Each Nare route As Needed. (Patient not taking: Reported on 11/19/2024)    oxybutynin XL (DITROPAN-XL) 10 MG 24 hr tablet take 1 tablet by mouth once daily (Patient not taking: Reported on 11/19/2024)     No current facility-administered medications on file prior to visit.     Current Medications:  Scheduled Meds:  Continuous Infusions:No current facility-administered medications for this visit.    PRN Meds:.    I have reviewed the patient's medical history in detail and updated the computerized patient record.  Review and summarization of old records includes:    Past Medical History:   Diagnosis Date    Ankle sprain various    Arthritis     Arthritis of back 2016    Bladder disorder     BPH (benign prostatic hyperplasia)     Cardiomegaly 08/2016    Chronic right-sided low back pain without sciatica     Colon polyps     FOLLOWED BY DR. CITLALLI LAZO    DDD (degenerative disc disease), lumbar     ED (erectile dysfunction)     Frozen shoulder     Hemoptysis 10/2013    Hemorrhagic otitis externa of right ear 10/06/2016    Hyperlipidemia     IFG (impaired fasting glucose)     Irregular heart beat     Knee sprain various    Knee swelling 2000    Laceration of finger 06/04/2018    LEFT MIDDLE FINGER    Leg edema, left 07/31/2016    WITH CONTUSION, SEEN AT [facility] ER    Lumbosacral disc disease 2016    OA (osteoarthritis)     PAF (paroxysmal atrial fibrillation) 05/15/2018    ADMITTED TO [place]    Periarthritis of shoulder 2018    Pharyngitis 05/02/2016    SEEN AT [facility]    Pharyngitis 03/21/2016    SEEN AT [facility]    Pneumonia 10/2010    Polio 1945    Rectal bleeding     Rotator cuff syndrome 2018    Skin cancer     SQUAMOUS CELL CARCINOMA OF SCALP    Swimmer's ear of left side 10/04/2016    SEEN AT [facility]    Tear of  meniscus of knee 2000         Past Surgical History:   Procedure Laterality Date    CARDIOVERSION N/A 2018    DR. LUAC AGUIRRE AT [place]    COLONOSCOPY N/A 2009    ENTIRE COLON WNL, RESCOPE IN 5-10 YRS, DR. EZEKIEL OLIVEIRA AT [facility]    COLONOSCOPY N/A 2004    ENTIRE COLON WNL, DR. EZEKIEL OLIVEIRA AT [facility]    COLONOSCOPY N/A 2019    5 MM TUBULAR ADENOMA POLYP IN ILEOCECAL VALVE, 4 MM HYPERPLASTIC POLYP IN SIGMOID, RESCOPE IN 5 YRS, DR. CITLALLI LAZO AT [facility]    COLONOSCOPY N/A 10/30/2024    Procedure: COLONOSCOPY TO CECUM WITH POLYPECTOMY( COLD BX);  Surgeon: Mohinder Earl MD;  Location: [facility] ENDOSCOPY;  Service: General;  Laterality: N/A;  HX OF COLON POLYPS  POST: COLON POLYP; DIVERTICULOSIS; SKIN TAG    COLONOSCOPY W/ POLYPECTOMY N/A 2014    7 MM ADENOMATOUS POLYP IN TRANSVERSE, 5 MM ADENOMATOUS POLYP IN PROXIMAL TRANSVERSE, RESCOPE IN 5 YRS, DR. CITLALLI LAZO AT [facility]    EYE SURGERY      JOINT REPLACEMENT   and     KNEE ARTHROSCOPY  1993    KNEE ARTHROSCOPY Left 2001    DR. VANESSA FRITZ AT [place]    SHOULDER SURGERY  2018    TOTAL KNEE ARTHROPLASTY Left 2002    DR. VANESSA FRITZ AT [place]    TOTAL SHOULDER REVERSE ARTHROPLASTY Right 2018    DR. PATRICIA PENA AT [place]    TRIGGER POINT INJECTION  2017        Social History     Occupational History    Not on file   Tobacco Use    Smoking status: Former     Current packs/day: 0.00     Average packs/day: 1 pack/day for 15.0 years (15.0 ttl pk-yrs)     Types: Cigarettes     Start date: 1961     Quit date: 1976     Years since quittin.8    Smokeless tobacco: Never   Vaping Use    Vaping status: Never Used   Substance and Sexual Activity    Alcohol use: Yes     Alcohol/week: 6.0 standard drinks of alcohol     Types: 3 Glasses of wine, 3 Cans of beer per week     Comment: glasses/cans are on a weekly basis    Drug use: No    Sexual activity: Not Currently     Partners: Female      Social  "History     Social History Narrative    Not on file        Family History   Problem Relation Age of Onset    Broken bones Father     Heart attack Brother     Heart disease Brother        ROS: 14 point review of systems was performed and all other systems were reviewed and are negative except for documented findings in HPI and today's encounter.     Allergies:   Allergies   Allergen Reactions    Mobic [Meloxicam] Rash     Constitutional:  Denies fever, shaking or chills   Eyes:  Denies change in visual acuity   HENT:  Denies nasal congestion or sore throat   Respiratory:  Denies cough or shortness of breath   Cardiovascular:  Denies chest pain or severe LE edema   GI:  Denies abdominal pain, nausea, vomiting, bloody stools or diarrhea   Musculoskeletal:  Numbness, tingling, pain, or loss of motor function only as noted above in history of present illness.  : Denies painful urination or hematuria  Integument:  Denies rash, lesion or ulceration   Neurologic:  Denies headache or focal weakness  Endocrine:  Denies lymphadenopathy  Psych:  Denies confusion or change in mental status   Hem:  Denies active bleeding    OBJECTIVE:  Physical Exam: 79 y.o. male  Wt Readings from Last 3 Encounters:   11/19/24 107 kg (235 lb)   10/30/24 107 kg (235 lb 12.8 oz)   05/17/24 107 kg (235 lb)     Ht Readings from Last 1 Encounters:   11/19/24 182.9 cm (72\")     Body mass index is 31.87 kg/m².  Vitals:    11/19/24 1019   Temp: 98.4 °F (36.9 °C)     Vital signs reviewed.     General Appearance:    Alert, cooperative, in no acute distress                  Eyes: conjunctiva clear  ENT: external ears and nose atraumatic  CV: no peripheral edema  Resp: normal respiratory effort  Skin: no rashes or wounds; normal turgor  Psych: mood and affect appropriate  Lymph: no nodes appreciated  Neuro: gross sensation intact  Vascular:  Palpable peripheral pulse in noted extremity  Musculoskeletal Extremities: Skin warm dry intact with good pulses " movement and sensation patient has ecchymosis to right patella extensor mechanisms are intact midline patella tracks calf is very swollen and tender with pitting edema and both lower extremities.  Patient is able to ambulate with a flexion contracture there is no point tenderness to the plateau    Radiology:   Right knee 3 views done for pain with comparison views show osteoarthritic changes no acute fracture    Assessment:     ICD-10-CM ICD-9-CM   1. Pain  R52 780.96   2. Primary osteoarthritis of right knee  M17.11 715.16   3. Right calf pain  M79.661 729.5        Procedures  Recent travel swelling Will check a Doppler and do conservative treatment on the telemetry ecchymosis and have him follow-up    MDM/Plan:   The diagnosis(es), natural history, pathophysiology and treatment for diagnosis(es) were discussed. Opportunity given and questions answered.  Biomechanics of pertinent body areas discussed.  When appropriate, the use of ambulatory aids discussed.  BMI:  The concept of BMI body mass index and its importance and implications discussed.    EXERCISES:  Advice on benefits of, and types of regular/moderate exercise pertaining to orthopedic diagnosis(es).  MEDICATIONS:  The risks, benefits, warnings,side effects and alternatives of medications discussed.  Inflammation/pain control; with cold, heat, elevation and/or liniments discussed as appropriate  Cortisone Injection. See procedure note.  HOME EXERCISE/PT program encouraged  SPECIALTY REFERRAL  MEDICAL RECORDS reviewed from other provider(s) for past and current medical history pertinent to this complaint.    Send for a dopppler  11/19/2024    Much of this encounter note is an electronic transcription/translation of spoken language to printed text. The electronic translation of spoken language may permit erroneous, or at times, nonsensical words or phrases to be inadvertently transcribed; Although I have reviewed the note for such errors, some may still  exist

## 2024-11-22 ENCOUNTER — HOSPITAL ENCOUNTER (OUTPATIENT)
Dept: CARDIOLOGY | Facility: HOSPITAL | Age: 79
Discharge: HOME | End: 2024-11-22
Payer: MEDICARE

## 2024-11-22 DIAGNOSIS — M17.11 PRIMARY OSTEOARTHRITIS OF RIGHT KNEE: ICD-10-CM

## 2024-11-22 DIAGNOSIS — M79.661 RIGHT CALF PAIN: ICD-10-CM

## 2024-11-22 LAB
BH CV LOWER VASCULAR LEFT COMMON FEMORAL AUGMENT: NORMAL
BH CV LOWER VASCULAR LEFT COMMON FEMORAL COMPETENT: NORMAL
BH CV LOWER VASCULAR LEFT COMMON FEMORAL COMPRESS: NORMAL
BH CV LOWER VASCULAR LEFT COMMON FEMORAL PHASIC: NORMAL
BH CV LOWER VASCULAR LEFT COMMON FEMORAL SPONT: NORMAL
BH CV LOWER VASCULAR RIGHT COMMON FEMORAL AUGMENT: NORMAL
BH CV LOWER VASCULAR RIGHT COMMON FEMORAL COMPETENT: NORMAL
BH CV LOWER VASCULAR RIGHT COMMON FEMORAL COMPRESS: NORMAL
BH CV LOWER VASCULAR RIGHT COMMON FEMORAL PHASIC: NORMAL
BH CV LOWER VASCULAR RIGHT COMMON FEMORAL SPONT: NORMAL
BH CV LOWER VASCULAR RIGHT DISTAL FEMORAL COMPRESS: NORMAL
BH CV LOWER VASCULAR RIGHT GASTRONEMIUS COMPRESS: NORMAL
BH CV LOWER VASCULAR RIGHT GREATER SAPH AK COMPRESS: NORMAL
BH CV LOWER VASCULAR RIGHT GREATER SAPH BK COMPRESS: NORMAL
BH CV LOWER VASCULAR RIGHT LESSER SAPH COMPRESS: NORMAL
BH CV LOWER VASCULAR RIGHT MID FEMORAL AUGMENT: NORMAL
BH CV LOWER VASCULAR RIGHT MID FEMORAL COMPETENT: NORMAL
BH CV LOWER VASCULAR RIGHT MID FEMORAL COMPRESS: NORMAL
BH CV LOWER VASCULAR RIGHT MID FEMORAL PHASIC: NORMAL
BH CV LOWER VASCULAR RIGHT MID FEMORAL SPONT: NORMAL
BH CV LOWER VASCULAR RIGHT PERONEAL COMPRESS: NORMAL
BH CV LOWER VASCULAR RIGHT POPLITEAL AUGMENT: NORMAL
BH CV LOWER VASCULAR RIGHT POPLITEAL COMPETENT: NORMAL
BH CV LOWER VASCULAR RIGHT POPLITEAL COMPRESS: NORMAL
BH CV LOWER VASCULAR RIGHT POPLITEAL PHASIC: NORMAL
BH CV LOWER VASCULAR RIGHT POPLITEAL SPONT: NORMAL
BH CV LOWER VASCULAR RIGHT POSTERIOR TIBIAL COMPRESS: NORMAL
BH CV LOWER VASCULAR RIGHT PROFUNDA FEMORAL COMPRESS: NORMAL
BH CV LOWER VASCULAR RIGHT PROXIMAL FEMORAL COMPRESS: NORMAL
BH CV LOWER VASCULAR RIGHT SAPHENOFEMORAL JUNCTION COMPRESS: NORMAL

## 2024-11-22 PROCEDURE — 93971 EXTREMITY STUDY: CPT

## 2025-07-18 ENCOUNTER — OFFICE VISIT (OUTPATIENT)
Dept: GASTROENTEROLOGY | Facility: CLINIC | Age: 80
End: 2025-07-18
Payer: MEDICARE

## 2025-07-18 VITALS
WEIGHT: 223.4 LBS | BODY MASS INDEX: 30.26 KG/M2 | HEIGHT: 72 IN | SYSTOLIC BLOOD PRESSURE: 107 MMHG | TEMPERATURE: 97.3 F | DIASTOLIC BLOOD PRESSURE: 73 MMHG | HEART RATE: 98 BPM

## 2025-07-18 DIAGNOSIS — R19.7 DIARRHEA, UNSPECIFIED TYPE: Primary | ICD-10-CM

## 2025-07-18 RX ORDER — OXYCODONE AND ACETAMINOPHEN 10; 325 MG/1; MG/1
TABLET ORAL
COMMUNITY
Start: 2025-06-10

## 2025-07-18 RX ORDER — METOPROLOL SUCCINATE 100 MG/1
100 TABLET, EXTENDED RELEASE ORAL DAILY
COMMUNITY
Start: 2025-06-25 | End: 2025-09-23

## 2025-07-18 RX ORDER — SPIRONOLACTONE 25 MG/1
25 TABLET ORAL DAILY
COMMUNITY
Start: 2025-06-25 | End: 2025-09-23

## 2025-07-18 RX ORDER — DIGOXIN 250 MCG
250 TABLET ORAL DAILY
COMMUNITY
Start: 2025-06-24 | End: 2025-09-22

## 2025-07-18 RX ORDER — DILTIAZEM HYDROCHLORIDE 180 MG/1
180 CAPSULE, COATED, EXTENDED RELEASE ORAL DAILY
COMMUNITY
Start: 2025-04-23

## 2025-07-18 RX ORDER — FUROSEMIDE 40 MG/1
40 TABLET ORAL
COMMUNITY
Start: 2025-06-24 | End: 2025-09-22

## 2025-07-18 NOTE — PROGRESS NOTES
Chief Complaint   Patient presents with    Diarrhea       HPI    Eliecer Smith is a  80 y.o. male here to establish care as a new patient for complaints of diarrhea.    This patient will also follow with Dr. Schaffer.      Past medical history of arthritis, BPH, cardiomegaly, low back pain, CAD, A-fib, hyperlipidemia along with skin cancer.    Patient reports longstanding issues with episodic diarrhea dating back decades.  He uses Imodium as needed.  He has had more persistent diarrhea since he was hospitalized twice in June for lumbar stenosis and bilateral lower extremity edema.  He reports diarrhea that comes and goes throughout the week.  No more than 2 episodes in 1 day.  No abdominal pain, rectal pain or rectal bleeding.  His appetite has waxed and waned since he was in the hospital.  He has lost a few pounds as such.  His wife reports his appetite is getting better over the last week or so.    Colonoscopy (10/30/2024 11:47) -TA polyp x 1.  Diverticulosis.  Anal skin tag.  Recall 5 years.    Past Medical History:   Diagnosis Date    Ankle sprain various    Arthritis     Arthritis of back 2016    Arthritis of neck 2024    Bladder disorder     BPH (benign prostatic hyperplasia)     Cardiomegaly 08/2016    Cervical disc disorder 2024    Chronic right-sided low back pain without sciatica     Colon polyps     FOLLOWED BY DR. CITLALLI LAZO    Coronary artery disease 2017    AFIB    DDD (degenerative disc disease), lumbar     ED (erectile dysfunction)     Frozen shoulder     Hemoptysis 10/2013    Hemorrhagic otitis externa of right ear 10/06/2016    Hyperlipidemia     IFG (impaired fasting glucose)     Irregular heart beat     Knee sprain various    Knee swelling 2000    Laceration of finger 06/04/2018    LEFT MIDDLE FINGER    Leg edema, left 07/31/2016    WITH CONTUSION, SEEN AT [facility] ER    Lumbosacral disc disease 2016    OA (osteoarthritis)     PAF (paroxysmal atrial fibrillation) 05/15/2018    ADMITTED TO [place]     Periarthritis of shoulder 2018    Pharyngitis 05/02/2016    SEEN AT [facility]    Pharyngitis 03/21/2016    SEEN AT [facility]    Pneumonia 10/2010    Polio 1945    Rectal bleeding     Rotator cuff syndrome 2018    Scoliosis 2024    Skin cancer     SQUAMOUS CELL CARCINOMA OF SCALP    Swimmer's ear of left side 10/04/2016    SEEN AT [facility]    Tear of meniscus of knee 2000    Thrombocytopenia        Past Surgical History:   Procedure Laterality Date    CARDIOVERSION N/A 05/17/2018    DR. LUCA AGUIRRE AT [place]    COLONOSCOPY N/A 01/20/2009    ENTIRE COLON WNL, RESCOPE IN 5-10 YRS, DR. EZEKIEL OLIVEIRA AT [facility]    COLONOSCOPY N/A 02/03/2004    ENTIRE COLON WNL, DR. EZEKIEL OLIVEIRA AT [facility]    COLONOSCOPY N/A 07/03/2019    5 MM TUBULAR ADENOMA POLYP IN ILEOCECAL VALVE, 4 MM HYPERPLASTIC POLYP IN SIGMOID, RESCOPE IN 5 YRS, DR. CITLALLI LAZO AT [facility]    COLONOSCOPY N/A 10/30/2024    Procedure: COLONOSCOPY TO CECUM WITH POLYPECTOMY( COLD BX);  Surgeon: Mohinder Earl MD;  Location: [facility] ENDOSCOPY;  Service: General;  Laterality: N/A;  HX OF COLON POLYPS  POST: COLON POLYP; DIVERTICULOSIS; SKIN TAG    COLONOSCOPY W/ POLYPECTOMY N/A 02/12/2014    7 MM ADENOMATOUS POLYP IN TRANSVERSE, 5 MM ADENOMATOUS POLYP IN PROXIMAL TRANSVERSE, RESCOPE IN 5 YRS, DR. CITLALLI LAZO AT [facility]    EYE SURGERY      FLEXIBLE SIGMOIDOSCOPY  over 10 years ago    JOINT REPLACEMENT  2002 and 2018    KNEE ARTHROSCOPY  1993    KNEE ARTHROSCOPY Left 08/14/2001    DR. VANESSA FRITZ AT [place]    SHOULDER SURGERY  2018    TOTAL KNEE ARTHROPLASTY Left 06/11/2002    DR. VANESSA FRITZ AT [facility]    TOTAL SHOULDER REVERSE ARTHROPLASTY Right 12/13/2018    DR. PATRICIA PENA AT [facility]    TRIGGER POINT INJECTION  2017       Scheduled Meds:     Continuous Infusions: No current facility-administered medications for this visit.      PRN Meds:     Allergies   Allergen Reactions    Mobic [Meloxicam] Rash       Social History     Socioeconomic History    Marital status:    Tobacco Use     Smoking status: Former     Current packs/day: 0.00     Average packs/day: 1.1 packs/day for 20.0 years (22.5 ttl pk-yrs)     Types: Cigarettes     Start date: 1961     Quit date: 1976     Years since quittin.5    Smokeless tobacco: Never   Vaping Use    Vaping status: Never Used   Substance and Sexual Activity    Alcohol use: Not Currently     Alcohol/week: 6.0 standard drinks of alcohol     Types: 3 Glasses of wine, 3 Cans of beer per week     Comment: glasses/cans are on a weekly basis    Drug use: No    Sexual activity: Not Currently     Partners: Female       Family History   Problem Relation Age of Onset    Broken bones Father     Heart attack Brother     Heart disease Brother      Vitals:    25 1352   BP: 107/73   Pulse: 98   Temp: 97.3 °F (36.3 °C)       Physical Exam  Constitutional:       Appearance: He is well-developed.   Abdominal:      General: Bowel sounds are normal. There is no distension.      Palpations: Abdomen is soft. There is no mass.      Tenderness: There is no abdominal tenderness. There is no guarding.      Hernia: No hernia is present.   Skin:     General: Skin is warm and dry.      Capillary Refill: Capillary refill takes less than 2 seconds.   Neurological:      Mental Status: He is alert and oriented to person, place, and time.   Psychiatric:         Behavior: Behavior normal.     Assessment    Diagnoses and all orders for this visit:    1. Diarrhea, unspecified type (Primary)  -     Clostridioides difficile EIA - Stool, Per Rectum  -     Stool Culture (Reference Lab) - Stool, Per Rectum    Plan    Suspect functional diarrhea given chronicity and slight worsening with recent hospital stay  Start Howard' colon health probiotics in combination with Metamucil 1 tablespoon daily and monitor for improvement in the coming weeks  Trial low FODMAP diet  Check stool testing as above for thoroughness sake  Recent reassuring colonoscopy which I reviewed with the patient  and his wife  Follow-up and further recommendations pending stool testing results         SEEMA Milan  [facility]  [address] [place] [address]  Office: [phone]

## 2025-07-22 LAB
C DIFF TOX A+B STL QL IA: NEGATIVE
SPECIMEN STATUS: NORMAL

## 2025-07-25 LAB
BACTERIA SPEC CULT: NORMAL
BACTERIA SPEC CULT: NORMAL
CAMPYLOBACTER STL CULT: NORMAL
E COLI SXT STL QL IA: NEGATIVE
SALM + SHIG STL CULT: NORMAL

## (undated) DEVICE — CANN O2 ETCO2 FITS ALL CONN CO2 SMPL A/ 7IN DISP LF

## (undated) DEVICE — LN SMPL CO2 SHTRM SD STREAM W/M LUER

## (undated) DEVICE — CANN NASL CO2 TRULINK W/O2 A/

## (undated) DEVICE — TUBING, SUCTION, 1/4" X 10', STRAIGHT: Brand: MEDLINE

## (undated) DEVICE — ADAPT CLN BIOGUARD AIR/H2O DISP

## (undated) DEVICE — SINGLE-USE BIOPSY FORCEPS: Brand: RADIAL JAW 4

## (undated) DEVICE — THE TORRENT IRRIGATION SCOPE CONNECTOR IS USED WITH THE TORRENT IRRIGATION TUBING TO PROVIDE IRRIGATION FLUIDS SUCH AS STERILE WATER DURING GASTROINTESTINAL ENDOSCOPIC PROCEDURES WHEN USED IN CONJUNCTION WITH AN IRRIGATION PUMP (OR ELECTROSURGICAL UNIT).: Brand: TORRENT

## (undated) DEVICE — KT ORCA ORCAPOD DISP STRL

## (undated) DEVICE — SENSR O2 OXIMAX FNGR A/ 18IN NONSTR

## (undated) DEVICE — Device: Brand: DEFENDO AIR/WATER/SUCTION AND BIOPSY VALVE